# Patient Record
Sex: MALE | Race: WHITE | NOT HISPANIC OR LATINO | Employment: OTHER | URBAN - NONMETROPOLITAN AREA
[De-identification: names, ages, dates, MRNs, and addresses within clinical notes are randomized per-mention and may not be internally consistent; named-entity substitution may affect disease eponyms.]

---

## 2017-03-23 ENCOUNTER — CLINICAL SUPPORT (OUTPATIENT)
Dept: CARDIOLOGY | Facility: CLINIC | Age: 72
End: 2017-03-23

## 2017-03-23 ENCOUNTER — OFFICE VISIT (OUTPATIENT)
Dept: CARDIOLOGY | Facility: CLINIC | Age: 72
End: 2017-03-23

## 2017-03-23 VITALS
BODY MASS INDEX: 27.86 KG/M2 | HEIGHT: 68 IN | WEIGHT: 183.8 LBS | DIASTOLIC BLOOD PRESSURE: 68 MMHG | SYSTOLIC BLOOD PRESSURE: 130 MMHG | HEART RATE: 70 BPM

## 2017-03-23 DIAGNOSIS — Z95.810 AICD (AUTOMATIC CARDIOVERTER/DEFIBRILLATOR) PRESENT: ICD-10-CM

## 2017-03-23 DIAGNOSIS — I42.8 NON-ISCHEMIC CARDIOMYOPATHY (HCC): ICD-10-CM

## 2017-03-23 DIAGNOSIS — E78.2 MIXED HYPERLIPIDEMIA: ICD-10-CM

## 2017-03-23 DIAGNOSIS — I42.8 NON-ISCHEMIC CARDIOMYOPATHY (HCC): Primary | ICD-10-CM

## 2017-03-23 DIAGNOSIS — Z95.810 AICD (AUTOMATIC CARDIOVERTER/DEFIBRILLATOR) PRESENT: Primary | ICD-10-CM

## 2017-03-23 PROCEDURE — 93000 ELECTROCARDIOGRAM COMPLETE: CPT | Performed by: NURSE PRACTITIONER

## 2017-03-23 PROCEDURE — 93284 PRGRMG EVAL IMPLANTABLE DFB: CPT | Performed by: INTERNAL MEDICINE

## 2017-03-23 PROCEDURE — 99213 OFFICE O/P EST LOW 20 MIN: CPT | Performed by: NURSE PRACTITIONER

## 2017-03-23 NOTE — PROGRESS NOTES
Subjective:     Encounter Date:03/23/2017      Patient ID: Wilmar Reyes Jr. is a 71 y.o. male.    Chief Complaint:  History of Present Illness   Patient is here for follow up on echo, which has an improved EF. Patient has nonischemic cardiomyopathy. Patient also has a history of chemo. Patient is stable.No weight gain, no leg swelling, no shortness of breath, no chest pain. Overall patient is stable. Due for upcoming surgery for biopsy of lymph node.     The following portions of the patient's history were reviewed and updated as appropriate: allergies, current medications, past family history, past medical history, past social history, past surgical history and problem list.   Past Medical History:   Diagnosis Date   • AICD (automatic cardioverter/defibrillator) present    • CAD (coronary artery disease)    • Cancer    • COPD (chronic obstructive pulmonary disease)    • HLD (hyperlipidemia)    • Hypertension    • Immune deficiency disorder    • Myocardial infarction      Past Surgical History:   Procedure Laterality Date   • A-V CARDIAC PACEMAKER INSERTION     • CARDIAC DEFIBRILLATOR PLACEMENT     • CORONARY ANGIOPLASTY     • LYMPH NODE BIOPSY      LEG   • SKIN CANCER EXCISION       Social History     Social History   • Marital status:      Spouse name: N/A   • Number of children: N/A   • Years of education: N/A     Occupational History   • Not on file.     Social History Main Topics   • Smoking status: Former Smoker   • Smokeless tobacco: Never Used   • Alcohol use No   • Drug use: No   • Sexual activity: No     Other Topics Concern   • Not on file     Social History Narrative     Current Outpatient Prescriptions on File Prior to Visit   Medication Sig Dispense Refill   • budesonide-formoterol (SYMBICORT) 160-4.5 MCG/ACT inhaler Inhale 2 puffs 2 (Two) Times a Day.     • carvedilol (COREG) 25 MG tablet Take 25 mg by mouth 2 (Two) Times a Day With Meals.     • fenofibrate (TRICOR) 145 MG tablet Take 145  mg by mouth Daily.     • furosemide (LASIX) 20 MG tablet Take 20 mg by mouth 2 (Two) Times a Day.     • HYDROcodone-acetaminophen (NORCO) 7.5-325 MG per tablet Take 1 tablet by mouth Every 6 (Six) Hours As Needed for moderate pain (4-6).     • ipratropium-albuterol (COMBIVENT RESPIMAT)  MCG/ACT inhaler Inhale 1 puff 4 (Four) Times a Day As Needed for wheezing.     • levoFLOXacin (LEVAQUIN) 500 MG tablet Take 1 tablet by mouth Daily. 6 tablet 0   • lisinopril (PRINIVIL,ZESTRIL) 40 MG tablet Take 40 mg by mouth Daily.     • nitroglycerin (NITROSTAT) 0.4 MG SL tablet Place 0.4 mg under the tongue Every 5 (Five) Minutes As Needed for chest pain. Take no more than 3 doses in 15 minutes.     • POTASSIUM CHLORIDE ER PO Take 10 mEq by mouth.     • rosuvastatin (CRESTOR) 40 MG tablet Take 40 mg by mouth Daily.       No current facility-administered medications on file prior to visit.      No Known Allergies      Review of Systems   Constitution: Negative for chills, decreased appetite, fever, malaise/fatigue, weight gain and weight loss.   HENT: Negative for headaches and nosebleeds.    Eyes: Negative for visual disturbance.   Cardiovascular: Positive for dyspnea on exertion. Negative for chest pain, leg swelling, near-syncope, orthopnea, palpitations, paroxysmal nocturnal dyspnea and syncope.   Respiratory: Positive for shortness of breath. Negative for cough, hemoptysis and snoring.    Endocrine: Negative for cold intolerance and heat intolerance.   Hematologic/Lymphatic: Negative for bleeding problem. Does not bruise/bleed easily.   Skin: Negative for rash.   Musculoskeletal: Negative for back pain and falls.   Gastrointestinal: Negative for abdominal pain, constipation, diarrhea, heartburn, melena, nausea and vomiting.   Genitourinary: Negative for hematuria.   Neurological: Negative for dizziness and light-headedness.   Psychiatric/Behavioral: Negative for altered mental status.   Allergic/Immunologic: Negative  "for persistent infections.         ECG 12 Lead  Date/Time: 3/23/2017 11:21 AM  Performed by: CANDIDA ARNOLD  Authorized by: CANDIDA ARNOLD   Comparison: compared with previous ECG from 12/22/2016  Similar to previous ECG  Comparison to previous ECG: AV Paced  Rhythm: paced               Objective:     Physical Exam   Constitutional: He is oriented to person, place, and time. He appears well-developed and well-nourished.   HENT:   Head: Normocephalic and atraumatic.   Eyes: Pupils are equal, round, and reactive to light.   Neck: Normal range of motion. Neck supple. No JVD present. Carotid bruit is not present.   Cardiovascular: Normal rate, regular rhythm, normal heart sounds and intact distal pulses.    Pulmonary/Chest: Effort normal and breath sounds normal.   Coarse breath sounds   Abdominal: Soft. Bowel sounds are normal.   Musculoskeletal: Normal range of motion.   Neurological: He is alert and oriented to person, place, and time. He has normal reflexes.   Skin: Skin is warm and dry.   Psychiatric: He has a normal mood and affect. His behavior is normal. Judgment and thought content normal.     Blood pressure 130/68, pulse 70, height 68\" (172.7 cm), weight 183 lb 12.8 oz (83.4 kg).    Lab Review:       Assessment:          Diagnosis Plan   1. Non-ischemic cardiomyopathy     2. Mixed hyperlipidemia     3. AICD (automatic cardioverter/defibrillator) present            Plan:       1. Echo was checked to reevaluate EF- 60%. Compensated heart failure.  2. Lipids followed by PCP  3. Will check AICD today, no check listed since 2015  4. Low salt and daily weights discussed       "

## 2017-03-24 NOTE — PROGRESS NOTES
Bi-V AICD Evaluation Report  In office    2017    Primary Cardiologist: Lexi  : Guidant Model: Energen  Implant date: 2013    Reason for evaluation:routine  Indication for AICD: nonischemic cardiomyopathy    Measurements  Atrial sensing:  P wave: 1.4 mV  Atrial threshold: 0.8 V @ 0.4 ms  Atrial lead impedance: 677 ohms  Ventricular sensing:  R wave: 22.36 mV  RV Threshold:  1.5V @ 0.4 ms  RV Impedance:  516 ohms  Shock impedance: 48 ohms\  LV sensin.8 mV  LV Threshold:  2.6V @ 2ms  LV Impedance:  883ohms      Diagnostic Data  Atrial paced: 75 %  Ventricular paced: 98 %  Other: 30 SVT episodes- longest 22 seconds, max v rate 195 bpm   6 a-fib episodes- longest 4 seconds, v-paced during episodes  Battery status: satisfactory   Est 3.5 years      Final Parameters  Mode: DDD     Lower rate: 70 bpm   Upper rate: 130 bpm  AV Delay: 200 ms paced    150 ms sensed   Atrial - Amplitude: 1.6 V   Pulse width: 0.4 ms   Sensitivity: 0.25 mV   Ventricular:  RV Amplitude: 3 V @ 0.4 ms   Sensitivity: 0.6 mV            LV Amplitude:  3.1V @ 2ms  Sensitivity: 1 mV  Changes made: changed atrial output to 1.6V, changed RV output to 3V, changed LV output to 3.1V  Conclusions: normal AICD function, no therapy delivered and stable pacing and sensing thresholds    Follow up: 3 months

## 2017-03-28 NOTE — PROGRESS NOTES
I have reviewed the notes, assessments, and/or procedures performed by Jo Zamora, I concur with her documentation of Wilmar Reyes Jr..

## 2017-06-05 ENCOUNTER — APPOINTMENT (OUTPATIENT)
Dept: GENERAL RADIOLOGY | Facility: HOSPITAL | Age: 72
End: 2017-06-05

## 2017-06-05 ENCOUNTER — HOSPITAL ENCOUNTER (EMERGENCY)
Facility: HOSPITAL | Age: 72
Discharge: HOME | End: 2017-06-05
Attending: FAMILY MEDICINE | Admitting: FAMILY MEDICINE

## 2017-06-05 VITALS
SYSTOLIC BLOOD PRESSURE: 113 MMHG | BODY MASS INDEX: 28.64 KG/M2 | RESPIRATION RATE: 18 BRPM | HEART RATE: 74 BPM | OXYGEN SATURATION: 96 % | TEMPERATURE: 99.1 F | WEIGHT: 189 LBS | DIASTOLIC BLOOD PRESSURE: 57 MMHG | HEIGHT: 68 IN

## 2017-06-05 DIAGNOSIS — E87.6 HYPOKALEMIA: ICD-10-CM

## 2017-06-05 DIAGNOSIS — R11.2 NON-INTRACTABLE VOMITING WITH NAUSEA, UNSPECIFIED VOMITING TYPE: ICD-10-CM

## 2017-06-05 DIAGNOSIS — A08.4 VIRAL GASTROENTERITIS: Primary | ICD-10-CM

## 2017-06-05 LAB
ALBUMIN SERPL-MCNC: 3 G/DL (ref 3.5–5)
ALBUMIN/GLOB SERPL: 1.1 G/DL (ref 1.1–2.5)
ALP SERPL-CCNC: 59 U/L (ref 24–120)
ALT SERPL W P-5'-P-CCNC: 25 U/L (ref 0–54)
AMYLASE SERPL-CCNC: 98 U/L (ref 30–110)
ANION GAP SERPL CALCULATED.3IONS-SCNC: 13 MMOL/L (ref 4–13)
AST SERPL-CCNC: 33 U/L (ref 7–45)
BACTERIA UR QL AUTO: ABNORMAL /HPF
BILIRUB SERPL-MCNC: 0.9 MG/DL (ref 0.1–1)
BILIRUB UR QL STRIP: NEGATIVE
BUN BLD-MCNC: 17 MG/DL (ref 5–21)
BUN/CREAT SERPL: 15.3 (ref 7–25)
CALCIUM SPEC-SCNC: 8.4 MG/DL (ref 8.4–10.4)
CHLORIDE SERPL-SCNC: 108 MMOL/L (ref 98–110)
CK MB SERPL-CCNC: 0.22 NG/ML (ref 0–5)
CLARITY UR: ABNORMAL
CO2 SERPL-SCNC: 19 MMOL/L (ref 24–31)
COLOR UR: ABNORMAL
CREAT BLD-MCNC: 1.11 MG/DL (ref 0.5–1.4)
D-LACTATE SERPL-SCNC: 0.9 MMOL/L (ref 0.5–2)
DEPRECATED RDW RBC AUTO: 41.8 FL (ref 40–54)
ERYTHROCYTE [DISTWIDTH] IN BLOOD BY AUTOMATED COUNT: 13 % (ref 12–15)
ERYTHROCYTE [SEDIMENTATION RATE] IN BLOOD: 3 MM/HR (ref 0–15)
GFR SERPL CREATININE-BSD FRML MDRD: 65 ML/MIN/1.73
GLOBULIN UR ELPH-MCNC: 2.7 GM/DL
GLUCOSE BLD-MCNC: 111 MG/DL (ref 70–100)
GLUCOSE UR STRIP-MCNC: NEGATIVE MG/DL
HCT VFR BLD AUTO: 33.9 % (ref 40–52)
HGB BLD-MCNC: 11.6 G/DL (ref 14–18)
HGB UR QL STRIP.AUTO: NEGATIVE
HOLD SPECIMEN: NORMAL
HOLD SPECIMEN: NORMAL
HYALINE CASTS UR QL AUTO: ABNORMAL /LPF
KETONES UR QL STRIP: NEGATIVE
LEUKOCYTE ESTERASE UR QL STRIP.AUTO: ABNORMAL
LIPASE SERPL-CCNC: 72 U/L (ref 23–203)
LYMPHOCYTES # BLD MANUAL: 0.1 10*3/MM3 (ref 0.72–4.86)
LYMPHOCYTES NFR BLD MANUAL: 2 % (ref 15–45)
MCH RBC QN AUTO: 30.1 PG (ref 28–32)
MCHC RBC AUTO-ENTMCNC: 34.2 G/DL (ref 33–36)
MCV RBC AUTO: 87.8 FL (ref 82–95)
MYOGLOBIN SERPL-MCNC: 49.4 NG/ML (ref 0–110)
NEUTROPHILS # BLD AUTO: 4.66 10*3/MM3 (ref 1.87–8.4)
NEUTROPHILS NFR BLD MANUAL: 66 % (ref 39–78)
NEUTS BAND NFR BLD MANUAL: 32 % (ref 0–10)
NITRITE UR QL STRIP: POSITIVE
PH UR STRIP.AUTO: 5.5 [PH] (ref 5–8)
PLATELET # BLD AUTO: 100 10*3/MM3 (ref 130–400)
PMV BLD AUTO: 10.8 FL (ref 6–12)
POIKILOCYTOSIS BLD QL SMEAR: ABNORMAL
POTASSIUM BLD-SCNC: 3.2 MMOL/L (ref 3.5–5.3)
PROT SERPL-MCNC: 5.7 G/DL (ref 6.3–8.7)
PROT UR QL STRIP: ABNORMAL
RBC # BLD AUTO: 3.86 10*6/MM3 (ref 4.8–5.9)
RBC # UR: ABNORMAL /HPF
REF LAB TEST METHOD: ABNORMAL
SCAN SLIDE: NORMAL
SMALL PLATELETS BLD QL SMEAR: ABNORMAL
SODIUM BLD-SCNC: 140 MMOL/L (ref 135–145)
SP GR UR STRIP: 1.02 (ref 1–1.03)
SQUAMOUS #/AREA URNS HPF: ABNORMAL /HPF
UROBILINOGEN UR QL STRIP: ABNORMAL
WBC MORPH BLD: NORMAL
WBC NRBC COR # BLD: 4.76 10*3/MM3 (ref 4.8–10.8)
WBC UR QL AUTO: ABNORMAL /HPF
WHOLE BLOOD HOLD SPECIMEN: NORMAL
WHOLE BLOOD HOLD SPECIMEN: NORMAL

## 2017-06-05 PROCEDURE — 96374 THER/PROPH/DIAG INJ IV PUSH: CPT

## 2017-06-05 PROCEDURE — 85007 BL SMEAR W/DIFF WBC COUNT: CPT | Performed by: FAMILY MEDICINE

## 2017-06-05 PROCEDURE — 80053 COMPREHEN METABOLIC PANEL: CPT | Performed by: FAMILY MEDICINE

## 2017-06-05 PROCEDURE — 82150 ASSAY OF AMYLASE: CPT | Performed by: FAMILY MEDICINE

## 2017-06-05 PROCEDURE — 93010 ELECTROCARDIOGRAM REPORT: CPT | Performed by: INTERNAL MEDICINE

## 2017-06-05 PROCEDURE — 82553 CREATINE MB FRACTION: CPT | Performed by: FAMILY MEDICINE

## 2017-06-05 PROCEDURE — 83690 ASSAY OF LIPASE: CPT | Performed by: FAMILY MEDICINE

## 2017-06-05 PROCEDURE — 87086 URINE CULTURE/COLONY COUNT: CPT | Performed by: FAMILY MEDICINE

## 2017-06-05 PROCEDURE — 99284 EMERGENCY DEPT VISIT MOD MDM: CPT

## 2017-06-05 PROCEDURE — 85651 RBC SED RATE NONAUTOMATED: CPT | Performed by: FAMILY MEDICINE

## 2017-06-05 PROCEDURE — 74020 HC XR ABDOMEN FLAT & UPRIGHT: CPT

## 2017-06-05 PROCEDURE — 93005 ELECTROCARDIOGRAM TRACING: CPT | Performed by: FAMILY MEDICINE

## 2017-06-05 PROCEDURE — 87186 SC STD MICRODIL/AGAR DIL: CPT | Performed by: FAMILY MEDICINE

## 2017-06-05 PROCEDURE — 83874 ASSAY OF MYOGLOBIN: CPT | Performed by: FAMILY MEDICINE

## 2017-06-05 PROCEDURE — 96361 HYDRATE IV INFUSION ADD-ON: CPT

## 2017-06-05 PROCEDURE — 87077 CULTURE AEROBIC IDENTIFY: CPT | Performed by: FAMILY MEDICINE

## 2017-06-05 PROCEDURE — 85025 COMPLETE CBC W/AUTO DIFF WBC: CPT | Performed by: FAMILY MEDICINE

## 2017-06-05 PROCEDURE — 71010 HC CHEST PA OR AP: CPT

## 2017-06-05 PROCEDURE — 25010000002 ONDANSETRON PER 1 MG: Performed by: FAMILY MEDICINE

## 2017-06-05 PROCEDURE — 81001 URINALYSIS AUTO W/SCOPE: CPT | Performed by: FAMILY MEDICINE

## 2017-06-05 PROCEDURE — 83605 ASSAY OF LACTIC ACID: CPT | Performed by: FAMILY MEDICINE

## 2017-06-05 RX ORDER — SODIUM CHLORIDE 9 MG/ML
125 INJECTION, SOLUTION INTRAVENOUS CONTINUOUS
Status: DISCONTINUED | OUTPATIENT
Start: 2017-06-05 | End: 2017-06-05 | Stop reason: HOSPADM

## 2017-06-05 RX ORDER — ONDANSETRON 4 MG/1
4 TABLET, ORALLY DISINTEGRATING ORAL 4 TIMES DAILY PRN
Qty: 20 TABLET | Refills: 0 | Status: ON HOLD | OUTPATIENT
Start: 2017-06-05 | End: 2020-06-20

## 2017-06-05 RX ORDER — ONDANSETRON 2 MG/ML
4 INJECTION INTRAMUSCULAR; INTRAVENOUS ONCE
Status: COMPLETED | OUTPATIENT
Start: 2017-06-05 | End: 2017-06-05

## 2017-06-05 RX ORDER — POTASSIUM CHLORIDE 20 MEQ/1
20 TABLET, EXTENDED RELEASE ORAL ONCE
Status: DISCONTINUED | OUTPATIENT
Start: 2017-06-05 | End: 2017-06-05 | Stop reason: CLARIF

## 2017-06-05 RX ORDER — POTASSIUM CHLORIDE 750 MG/1
20 CAPSULE, EXTENDED RELEASE ORAL ONCE
Status: COMPLETED | OUTPATIENT
Start: 2017-06-05 | End: 2017-06-05

## 2017-06-05 RX ADMIN — POTASSIUM CHLORIDE 20 MEQ: 750 CAPSULE, EXTENDED RELEASE ORAL at 19:56

## 2017-06-05 RX ADMIN — SODIUM CHLORIDE 1000 ML: 9 INJECTION, SOLUTION INTRAVENOUS at 19:48

## 2017-06-05 RX ADMIN — SODIUM CHLORIDE 125 ML/HR: 9 INJECTION, SOLUTION INTRAVENOUS at 20:47

## 2017-06-05 RX ADMIN — ONDANSETRON 4 MG: 2 INJECTION INTRAMUSCULAR; INTRAVENOUS at 19:46

## 2017-06-05 NOTE — ED NOTES
PT STATES THAT HE HAD A FEVER BEFORE HE WENT FOR CHEMO THIS MORNING, PT STATED THEY STILL GAVE HIM IS CHEMO SHOT THIS MORNING.     Nena García RN  06/05/17 1661

## 2017-06-05 NOTE — ED PROVIDER NOTES
Subjective   Patient is a 71 y.o. male presenting with nausea.   Nausea   The primary symptoms include fever, fatigue, abdominal pain, nausea and vomiting. Primary symptoms do not include diarrhea, dysuria, arthralgias or rash. The illness began 3 to 5 days ago. The onset was gradual. The problem has not changed since onset.  The illness is also significant for chills and anorexia. The illness does not include back pain. Associated medical issues comments: currently being treated for melanoma. Risk factors: chemo.       Review of Systems   Constitutional: Positive for chills, fatigue and fever. Negative for activity change, appetite change and diaphoresis.   HENT: Negative for congestion, ear pain, nosebleeds, postnasal drip and sinus pressure.    Eyes: Negative for photophobia and pain.   Respiratory: Negative for cough, chest tightness, shortness of breath and wheezing.    Cardiovascular: Negative for chest pain.   Gastrointestinal: Positive for abdominal pain, anorexia, nausea and vomiting. Negative for abdominal distention, blood in stool and diarrhea.   Endocrine: Negative for cold intolerance and heat intolerance.   Genitourinary: Negative for difficulty urinating, dysuria and flank pain.   Musculoskeletal: Negative for arthralgias, back pain, neck pain and neck stiffness.   Skin: Positive for pallor. Negative for rash.   Neurological: Negative for dizziness, weakness and headaches.   Hematological: Negative for adenopathy. Does not bruise/bleed easily.   Psychiatric/Behavioral: Negative for confusion and sleep disturbance. The patient is not nervous/anxious.        Past Medical History:   Diagnosis Date   • AICD (automatic cardioverter/defibrillator) present    • CAD (coronary artery disease)    • CAD in native artery     7/2015- ECHO: EF 50-55% (increased from 2/2013- 25%), abnormal LV diastolic function. 8/2015- LexiScan: negative for ischemia/scar 2/2013- Cath: Mild CAD, non-ischemic cardiomyopathy.   •  Cancer    • Chest pain, unspecified    • Chronic combined systolic and diastolic congestive heart failure      7/2015- ECHO: EF 50-55% (increased from 2/2013- 25%), abnormal LV diastolic function.as    • COPD (chronic obstructive pulmonary disease)    • HLD (hyperlipidemia)    • Hyperlipidemia, mixed    • Hypertension    • Hypertension, benign    • Immune deficiency disorder    • Left bundle branch block    • Myocardial infarction    • Non-ischemic cardiomyopathy     LifeVest   • Status post implantation of automatic cardioverter/defibrillator (AICD)      Placed 4/2013. Interrogated by [facility].       No Known Allergies    Past Surgical History:   Procedure Laterality Date   • A-V CARDIAC PACEMAKER INSERTION     • CARDIAC DEFIBRILLATOR PLACEMENT Left 02/05/2013    Sev dil nonischemic cardiomyopathy. Mild CAD.   • CORONARY ANGIOPLASTY     • LYMPH NODE BIOPSY      left leg lymp node bx 2/2016   • OTHER SURGICAL HISTORY  04/29/2013    BS BiV ICD Implantation   • SKIN CANCER EXCISION         Family History   Problem Relation Age of Onset   • Heart disease Mother    • Heart disease Father    • Heart disease Sister    • Heart disease Brother    • Coronary artery disease Other      premature       Social History     Social History   • Marital status:      Spouse name: N/A   • Number of children: N/A   • Years of education: N/A     Social History Main Topics   • Smoking status: Former Smoker   • Smokeless tobacco: Never Used   • Alcohol use No   • Drug use: No   • Sexual activity: No     Other Topics Concern   • None     Social History Narrative           Objective   Physical Exam   Constitutional: He is oriented to person, place, and time. He appears well-developed and well-nourished. No distress.   HENT:   Head: Atraumatic.   Nose: Nose normal.   Mouth/Throat: Oropharynx is clear and moist. Mucous membranes are dry.   Eyes: Conjunctivae are normal. Pupils are equal, round, and reactive to light. No scleral  icterus.   Neck: Normal range of motion. Neck supple. No JVD present. No thyromegaly present.   Cardiovascular: Normal rate, regular rhythm, normal heart sounds and intact distal pulses.    No murmur heard.  Pulmonary/Chest: Effort normal and breath sounds normal. No respiratory distress. He has no wheezes. He has no rales. He exhibits no tenderness.   Abdominal: Soft. Bowel sounds are normal. He exhibits no distension and no mass. There is no tenderness. There is no rebound and no guarding.   Musculoskeletal: Normal range of motion. He exhibits no edema.   Lymphadenopathy:     He has no cervical adenopathy.   Neurological: He is alert and oriented to person, place, and time. He has normal reflexes. No cranial nerve deficit. Coordination normal.   Skin: Skin is warm and dry. No rash noted. He is not diaphoretic. No erythema. There is pallor.   Psychiatric: He has a normal mood and affect. His behavior is normal. Judgment and thought content normal.   Nursing note and vitals reviewed.      Procedures         ED Course  ED Course   Comment By Time   Symptoms have resolved, no nausea or vomiting.  [facility] and to arrange outpatient follow up, she does not take call.  Patient is confident he can talk with her in the am.  He wishes to go home now. Cullen Sauceda MD 06/05 2122                  [facility]  Number of Diagnoses or Management Options  Hypokalemia: new and requires workup  Non-intractable vomiting with nausea, unspecified vomiting type: new and requires workup  Viral gastroenteritis: new and requires workup     Amount and/or Complexity of Data Reviewed  Clinical lab tests: ordered and reviewed  Tests in the radiology section of CPT®: ordered and reviewed  Decide to obtain previous medical records or to obtain history from someone other than the patient: yes  Review and summarize past medical records: yes  Independent visualization of images, tracings,  or specimens: yes    Risk of Complications, Morbidity, and/or Mortality  Presenting problems: moderate  Diagnostic procedures: moderate  Management options: moderate    Patient Progress  Patient progress: resolved      Final diagnoses:   Viral gastroenteritis   Non-intractable vomiting with nausea, unspecified vomiting type   Hypokalemia            Cullen Sauceda MD  06/05/17 0348

## 2017-06-06 ENCOUNTER — HOSPITAL ENCOUNTER (EMERGENCY)
Facility: HOSPITAL | Age: 72
Discharge: HOME | End: 2017-06-06
Attending: EMERGENCY MEDICINE | Admitting: EMERGENCY MEDICINE

## 2017-06-06 ENCOUNTER — APPOINTMENT (OUTPATIENT)
Dept: GENERAL RADIOLOGY | Facility: HOSPITAL | Age: 72
End: 2017-06-06

## 2017-06-06 ENCOUNTER — APPOINTMENT (OUTPATIENT)
Dept: CT IMAGING | Facility: HOSPITAL | Age: 72
End: 2017-06-06

## 2017-06-06 VITALS
RESPIRATION RATE: 15 BRPM | OXYGEN SATURATION: 96 % | HEART RATE: 74 BPM | BODY MASS INDEX: 28.64 KG/M2 | WEIGHT: 189 LBS | TEMPERATURE: 97.9 F | SYSTOLIC BLOOD PRESSURE: 119 MMHG | HEIGHT: 68 IN | DIASTOLIC BLOOD PRESSURE: 66 MMHG

## 2017-06-06 DIAGNOSIS — N39.0 ACUTE UTI: Primary | ICD-10-CM

## 2017-06-06 LAB
ALBUMIN SERPL-MCNC: 2.7 G/DL (ref 3.5–5)
ALBUMIN/GLOB SERPL: 1.1 G/DL (ref 1.1–2.5)
ALP SERPL-CCNC: 63 U/L (ref 24–120)
ALT SERPL W P-5'-P-CCNC: 29 U/L (ref 0–54)
ANION GAP SERPL CALCULATED.3IONS-SCNC: 10 MMOL/L (ref 4–13)
APTT PPP: 21 SECONDS (ref 24.1–34.8)
AST SERPL-CCNC: 30 U/L (ref 7–45)
BACTERIA UR QL AUTO: ABNORMAL /HPF
BASOPHILS # BLD AUTO: 0 10*3/MM3 (ref 0–0.2)
BASOPHILS NFR BLD AUTO: 0 % (ref 0–2)
BILIRUB SERPL-MCNC: 0.9 MG/DL (ref 0.1–1)
BILIRUB UR QL STRIP: NEGATIVE
BUN BLD-MCNC: 12 MG/DL (ref 5–21)
BUN/CREAT SERPL: 10.3 (ref 7–25)
CALCIUM SPEC-SCNC: 7.8 MG/DL (ref 8.4–10.4)
CHLORIDE SERPL-SCNC: 110 MMOL/L (ref 98–110)
CLARITY UR: CLEAR
CO2 SERPL-SCNC: 18 MMOL/L (ref 24–31)
COLOR UR: YELLOW
CREAT BLD-MCNC: 1.17 MG/DL (ref 0.5–1.4)
DEPRECATED RDW RBC AUTO: 42.8 FL (ref 40–54)
EOSINOPHIL # BLD AUTO: 0 10*3/MM3 (ref 0–0.7)
EOSINOPHIL NFR BLD AUTO: 0 % (ref 0–4)
ERYTHROCYTE [DISTWIDTH] IN BLOOD BY AUTOMATED COUNT: 13.1 % (ref 12–15)
GFR SERPL CREATININE-BSD FRML MDRD: 61 ML/MIN/1.73
GLOBULIN UR ELPH-MCNC: 2.4 GM/DL
GLUCOSE BLD-MCNC: 97 MG/DL (ref 70–100)
GLUCOSE UR STRIP-MCNC: NEGATIVE MG/DL
HCT VFR BLD AUTO: 31.6 % (ref 40–52)
HGB BLD-MCNC: 10.7 G/DL (ref 14–18)
HGB UR QL STRIP.AUTO: NEGATIVE
HYALINE CASTS UR QL AUTO: ABNORMAL /LPF
IMM GRANULOCYTES # BLD: 0.01 10*3/MM3 (ref 0–0.03)
IMM GRANULOCYTES NFR BLD: 0.3 % (ref 0–5)
INR PPP: 1.1 (ref 0.91–1.09)
KETONES UR QL STRIP: NEGATIVE
LEUKOCYTE ESTERASE UR QL STRIP.AUTO: ABNORMAL
LYMPHOCYTES # BLD AUTO: 0.24 10*3/MM3 (ref 0.72–4.86)
LYMPHOCYTES NFR BLD AUTO: 7.5 % (ref 15–45)
MCH RBC QN AUTO: 30 PG (ref 28–32)
MCHC RBC AUTO-ENTMCNC: 33.9 G/DL (ref 33–36)
MCV RBC AUTO: 88.5 FL (ref 82–95)
MONOCYTES # BLD AUTO: 0.11 10*3/MM3 (ref 0.19–1.3)
MONOCYTES NFR BLD AUTO: 3.4 % (ref 4–12)
NEUTROPHILS # BLD AUTO: 2.84 10*3/MM3 (ref 1.87–8.4)
NEUTROPHILS NFR BLD AUTO: 88.8 % (ref 39–78)
NITRITE UR QL STRIP: POSITIVE
NT-PROBNP SERPL-MCNC: 3060 PG/ML (ref 0–900)
PH UR STRIP.AUTO: <=5 [PH] (ref 5–8)
PLATELET # BLD AUTO: 71 10*3/MM3 (ref 130–400)
PMV BLD AUTO: 11 FL (ref 6–12)
POTASSIUM BLD-SCNC: 3.1 MMOL/L (ref 3.5–5.3)
PROT SERPL-MCNC: 5.1 G/DL (ref 6.3–8.7)
PROT UR QL STRIP: NEGATIVE
PROTHROMBIN TIME: 14.6 SECONDS (ref 11.9–14.6)
RBC # BLD AUTO: 3.57 10*6/MM3 (ref 4.8–5.9)
RBC # UR: ABNORMAL /HPF
REF LAB TEST METHOD: ABNORMAL
SODIUM BLD-SCNC: 138 MMOL/L (ref 135–145)
SP GR UR STRIP: 1.01 (ref 1–1.03)
SQUAMOUS #/AREA URNS HPF: ABNORMAL /HPF
TROPONIN I SERPL-MCNC: 0.04 NG/ML (ref 0–0.07)
UROBILINOGEN UR QL STRIP: ABNORMAL
WBC NRBC COR # BLD: 3.2 10*3/MM3 (ref 4.8–10.8)
WBC UR QL AUTO: ABNORMAL /HPF

## 2017-06-06 PROCEDURE — 93010 ELECTROCARDIOGRAM REPORT: CPT | Performed by: INTERNAL MEDICINE

## 2017-06-06 PROCEDURE — 87077 CULTURE AEROBIC IDENTIFY: CPT | Performed by: EMERGENCY MEDICINE

## 2017-06-06 PROCEDURE — 85025 COMPLETE CBC W/AUTO DIFF WBC: CPT | Performed by: EMERGENCY MEDICINE

## 2017-06-06 PROCEDURE — 87040 BLOOD CULTURE FOR BACTERIA: CPT | Performed by: EMERGENCY MEDICINE

## 2017-06-06 PROCEDURE — 99284 EMERGENCY DEPT VISIT MOD MDM: CPT

## 2017-06-06 PROCEDURE — 87086 URINE CULTURE/COLONY COUNT: CPT | Performed by: EMERGENCY MEDICINE

## 2017-06-06 PROCEDURE — 93005 ELECTROCARDIOGRAM TRACING: CPT | Performed by: EMERGENCY MEDICINE

## 2017-06-06 PROCEDURE — 85610 PROTHROMBIN TIME: CPT | Performed by: EMERGENCY MEDICINE

## 2017-06-06 PROCEDURE — 94640 AIRWAY INHALATION TREATMENT: CPT

## 2017-06-06 PROCEDURE — 94799 UNLISTED PULMONARY SVC/PX: CPT

## 2017-06-06 PROCEDURE — 83880 ASSAY OF NATRIURETIC PEPTIDE: CPT | Performed by: EMERGENCY MEDICINE

## 2017-06-06 PROCEDURE — 71010 HC CHEST PA OR AP: CPT

## 2017-06-06 PROCEDURE — 74176 CT ABD & PELVIS W/O CONTRAST: CPT

## 2017-06-06 PROCEDURE — 81001 URINALYSIS AUTO W/SCOPE: CPT | Performed by: EMERGENCY MEDICINE

## 2017-06-06 PROCEDURE — 85730 THROMBOPLASTIN TIME PARTIAL: CPT | Performed by: EMERGENCY MEDICINE

## 2017-06-06 PROCEDURE — 96365 THER/PROPH/DIAG IV INF INIT: CPT

## 2017-06-06 PROCEDURE — 87186 SC STD MICRODIL/AGAR DIL: CPT | Performed by: EMERGENCY MEDICINE

## 2017-06-06 PROCEDURE — 80053 COMPREHEN METABOLIC PANEL: CPT | Performed by: EMERGENCY MEDICINE

## 2017-06-06 PROCEDURE — 84484 ASSAY OF TROPONIN QUANT: CPT

## 2017-06-06 PROCEDURE — 25010000002 CEFTRIAXONE: Performed by: EMERGENCY MEDICINE

## 2017-06-06 RX ORDER — IPRATROPIUM BROMIDE AND ALBUTEROL SULFATE 2.5; .5 MG/3ML; MG/3ML
3 SOLUTION RESPIRATORY (INHALATION) ONCE
Status: COMPLETED | OUTPATIENT
Start: 2017-06-06 | End: 2017-06-06

## 2017-06-06 RX ORDER — POTASSIUM CHLORIDE 20 MEQ/1
40 TABLET, EXTENDED RELEASE ORAL ONCE
Status: DISCONTINUED | OUTPATIENT
Start: 2017-06-06 | End: 2017-06-06 | Stop reason: CLARIF

## 2017-06-06 RX ORDER — POTASSIUM CHLORIDE 750 MG/1
40 CAPSULE, EXTENDED RELEASE ORAL ONCE
Status: COMPLETED | OUTPATIENT
Start: 2017-06-06 | End: 2017-06-06

## 2017-06-06 RX ADMIN — CEFTRIAXONE 1 G: 1 INJECTION, POWDER, FOR SOLUTION INTRAMUSCULAR; INTRAVENOUS at 18:35

## 2017-06-06 RX ADMIN — IPRATROPIUM BROMIDE AND ALBUTEROL SULFATE 3 ML: .5; 3 SOLUTION RESPIRATORY (INHALATION) at 15:39

## 2017-06-06 RX ADMIN — POTASSIUM CHLORIDE 40 MEQ: 750 CAPSULE, EXTENDED RELEASE ORAL at 18:32

## 2017-06-06 NOTE — ED PROVIDER NOTES
Subjective fever and chills.   History of Present Illness  Her hasty is a 71-year-old white man who presents today with chief complaint of fever and chills.  He was apparently seen here last night and diagnosed with a urinary tract infection.  He was subsequently given antibiotics.  He however has only taken one dose and states that he experience fevers and chills shaking chills and subsequently decided he will return to the emergency room to be reevaluated.  Upon my arrival at the bedside he says he does not feel well.  Review of Systems   Constitutional: Positive for chills and fever.   HENT: Negative.    Eyes: Negative.    Respiratory: Negative.    Cardiovascular: Negative.    Gastrointestinal: Negative.    Endocrine: Negative.    Genitourinary: Negative.    Musculoskeletal: Negative.    Skin: Negative.    Allergic/Immunologic: Negative.    Neurological: Negative.    Hematological: Negative.    Psychiatric/Behavioral: Negative.    All other systems reviewed and are negative.      Past Medical History:   Diagnosis Date   • AICD (automatic cardioverter/defibrillator) present    • CAD (coronary artery disease)    • CAD in native artery     7/2015- ECHO: EF 50-55% (increased from 2/2013- 25%), abnormal LV diastolic function. 8/2015- LexiScan: negative for ischemia/scar 2/2013- Cath: Mild CAD, non-ischemic cardiomyopathy.   • Cancer    • Chest pain, unspecified    • Chronic combined systolic and diastolic congestive heart failure      7/2015- ECHO: EF 50-55% (increased from 2/2013- 25%), abnormal LV diastolic function.as    • COPD (chronic obstructive pulmonary disease)    • HLD (hyperlipidemia)    • Hyperlipidemia, mixed    • Hypertension    • Hypertension, benign    • Immune deficiency disorder    • Left bundle branch block    • Myocardial infarction    • Non-ischemic cardiomyopathy     LifeVest   • Status post implantation of automatic cardioverter/defibrillator (AICD)      Placed 4/2013. Interrogated by [facility].       No Known Allergies    Past Surgical History:   Procedure Laterality Date   • A-V CARDIAC PACEMAKER INSERTION     • CARDIAC DEFIBRILLATOR PLACEMENT Left 02/05/2013    Sev dil nonischemic cardiomyopathy. Mild CAD.   • CORONARY ANGIOPLASTY     • LYMPH NODE BIOPSY      left leg lymp node bx 2/2016   • OTHER SURGICAL HISTORY  04/29/2013    BS BiV ICD Implantation   • SKIN CANCER EXCISION         Family History   Problem Relation Age of Onset   • Heart disease Mother    • Heart disease Father    • Heart disease Sister    • Heart disease Brother    • Coronary artery disease Other      premature       Social History     Social History   • Marital status:      Spouse name: N/A   • Number of children: N/A   • Years of education: N/A     Social History Main Topics   • Smoking status: Former Smoker   • Smokeless tobacco: Never Used   • Alcohol use No   • Drug use: No   • Sexual activity: No     Other Topics Concern   • None     Social History Narrative           Objective   Physical Exam   Constitutional: He is oriented to person, place, and time. He appears well-developed and well-nourished.   HENT:   Head: Normocephalic and atraumatic.   Right Ear: External ear normal.   Left Ear: External ear normal.   Nose: Nose normal.   Mouth/Throat: Oropharynx is clear and moist.   Eyes: Conjunctivae and EOM are normal. Pupils are equal, round, and reactive to light. Right eye exhibits no discharge. Left eye exhibits no discharge.   Neck: Normal range of motion. Neck supple. No thyromegaly present.   Cardiovascular: Normal rate, regular rhythm, normal heart sounds and intact distal pulses.  Exam reveals no friction rub.    No murmur heard.  Pulmonary/Chest: Effort normal and breath sounds normal. No respiratory distress.   Abdominal: Soft. Bowel sounds are normal. He exhibits no distension. There is no tenderness.   Musculoskeletal: Normal range of motion. He exhibits no edema or deformity.   Neurological: He is  alert and oriented to person, place, and time. He has normal reflexes. No cranial nerve deficit.   Skin: Skin is warm and dry. No rash noted.   Psychiatric: Judgment normal.   Nursing note and vitals reviewed.      Procedures         ED Course  ED Course   Comment By Time   I attempted to relay the results of the tests to the patient.  He has only had one dose of antibiotics at this time.  I did note the results of the CT scan however when I went to check the left inguinal area there is a healing surgical scar.  I explained to the patient that this point he is only had 1 dose of antibiotics.  My plan is to recommend that he continue his antibiotics.  Discharged to home with instructions to follow-up with his own primary care physician.  I will give him a dose of Rocephin just prior to discharge. Mally Boss MD 06/06 8503                  MDM  Number of Diagnoses or Management Options  Acute UTI: established and worsening     Amount and/or Complexity of Data Reviewed  Clinical lab tests: ordered and reviewed  Tests in the radiology section of CPT®: ordered and reviewed    Risk of Complications, Morbidity, and/or Mortality  Presenting problems: moderate  Diagnostic procedures: moderate  Management options: moderate    Patient Progress  Patient progress: stable      Final diagnoses:   Acute UTI            Mally Boss MD  06/06/17 [id number]

## 2017-06-06 NOTE — DISCHARGE INSTRUCTIONS
Please complete a course of antibiotics that you were given prior to discharge last night.  I recommend encouragement of fluids.  I also recommend that she follow with your own primary care physician within the next 24 hours.  Of course if your symptoms worsen or you are unable to follow with your primary care physician please feel free to return to the emergency room at any time.  Few do develop a temperature I would recommend that you take some Tylenol for it.

## 2017-06-07 LAB — BACTERIA SPEC AEROBE CULT: ABNORMAL

## 2017-06-08 LAB — BACTERIA SPEC AEROBE CULT: ABNORMAL

## 2017-06-09 ENCOUNTER — TELEPHONE (OUTPATIENT)
Dept: EMERGENCY DEPT | Facility: HOSPITAL | Age: 72
End: 2017-06-09

## 2017-06-09 NOTE — TELEPHONE ENCOUNTER
----- Message from RACHEL Mcginnis sent at 6/9/2017  9:23 AM CDT -----  Stop levaquin; start omnicef 300mg bid x 10 days   ----- Message -----     From: Lab, Background User     Sent: 6/6/2017   7:37 AM       To: Bh Pad Asap In Basket Results Pool

## 2017-06-11 LAB
BACTERIA SPEC AEROBE CULT: NORMAL
BACTERIA SPEC AEROBE CULT: NORMAL

## 2017-07-13 ENCOUNTER — CLINICAL SUPPORT (OUTPATIENT)
Dept: CARDIOLOGY | Facility: CLINIC | Age: 72
End: 2017-07-13

## 2017-07-13 ENCOUNTER — OFFICE VISIT (OUTPATIENT)
Dept: CARDIOLOGY | Facility: CLINIC | Age: 72
End: 2017-07-13

## 2017-07-13 VITALS
WEIGHT: 180.6 LBS | OXYGEN SATURATION: 98 % | SYSTOLIC BLOOD PRESSURE: 138 MMHG | DIASTOLIC BLOOD PRESSURE: 72 MMHG | BODY MASS INDEX: 27.37 KG/M2 | HEART RATE: 71 BPM | HEIGHT: 68 IN

## 2017-07-13 DIAGNOSIS — Z95.810 AICD (AUTOMATIC CARDIOVERTER/DEFIBRILLATOR) PRESENT: ICD-10-CM

## 2017-07-13 DIAGNOSIS — I42.8 NON-ISCHEMIC CARDIOMYOPATHY (HCC): ICD-10-CM

## 2017-07-13 DIAGNOSIS — E78.2 MIXED HYPERLIPIDEMIA: Primary | ICD-10-CM

## 2017-07-13 DIAGNOSIS — E16.2 HYPOGLYCEMIA: ICD-10-CM

## 2017-07-13 DIAGNOSIS — C43.9 METASTATIC MELANOMA (HCC): ICD-10-CM

## 2017-07-13 DIAGNOSIS — Z95.810 AICD (AUTOMATIC CARDIOVERTER/DEFIBRILLATOR) PRESENT: Primary | ICD-10-CM

## 2017-07-13 PROBLEM — I48.3 TYPICAL ATRIAL FLUTTER (HCC): Status: ACTIVE | Noted: 2017-07-13

## 2017-07-13 PROCEDURE — 93284 PRGRMG EVAL IMPLANTABLE DFB: CPT | Performed by: INTERNAL MEDICINE

## 2017-07-13 PROCEDURE — 99214 OFFICE O/P EST MOD 30 MIN: CPT | Performed by: INTERNAL MEDICINE

## 2017-07-13 RX ORDER — ACETAMINOPHEN 325 MG/1
650 TABLET ORAL EVERY 6 HOURS PRN
COMMUNITY

## 2017-07-13 NOTE — PROGRESS NOTES
Bi-V AICD Evaluation Report  In office    July 13, 2017    Primary Cardiologist: Lexi  : Guidant Model: Energen  Implant date: April 29, 2013    Reason for evaluation: routine  Indication for AICD: nonischemic cardiomyopathy    Measurements  Atrial sensing:  P wave: 1 mV  Atrial threshold: 0.7 V @ 0.4 ms  Atrial lead impedance: 692 ohms  Ventricular sensing:  R wave: >25 mV  RV Threshold:  1.4V @ 0.4 ms  RV Impedance:  502 ohms  Shock impedance:  51 ohms  LV Threshold:  1.8V @ 2ms  LV Impedance:  686 ohms      Diagnostic Data  Atrial paced: 78 %  Ventricular paced: 98 %  Other: 90 episodes- per Dr. Elliott it is paroxysmal a-fib- longest 17 seconds, max v rate 183 bpm  Battery status: satisfactory   Est 4 years      Final Parameters  Mode: DDD     Lower rate: 70 bpm   Upper rate: 130 bpm  AV Delay: 200 ms paced    150 ms sensed   Atrial - Amplitude: 1.6 V   Pulse width: 0.4 ms   Sensitivity: 0.25 mV   Ventricular:  RV Amplitude: 3 V @ 0.4 ms   Sensitivity: 0.6 mV            LV Amplitude:  2.3V @ 2ms  Sensitivity:  1 mV  Changes made: changed LV amplitude to 2.3V  Conclusions: normal AICD function, no therapy delivered and stable pacing and sensing thresholds    Follow up: 3 months

## 2017-07-13 NOTE — PROGRESS NOTES
Wilmar Reyes Jr.  [id number]  1945  71 y.o.  male    Referring Provider: Alex Haynes MD    Reason for Follow-up Visit: congestive heart failure  Device check shows Paroxysmal atrial fibrillation   No pedal edema  Overall doing well  Denies any chest pain  No excessive shortness of breath  No palpitations  Compliant with medications        History of present illness:  Wilmar Reyes Jr. is a 71 y.o. yo male with history of congestive heart failure  who presents today for   Chief Complaint   Patient presents with   • Cardiomyopathy     3 month f/u    .    History  Past Medical History:   Diagnosis Date   • AICD (automatic cardioverter/defibrillator) present    • CAD (coronary artery disease)    • CAD in native artery     7/2015- ECHO: EF 50-55% (increased from 2/2013- 25%), abnormal LV diastolic function. 8/2015- LexiScan: negative for ischemia/scar 2/2013- Cath: Mild CAD, non-ischemic cardiomyopathy.   • Cancer    • Chest pain, unspecified    • Chronic combined systolic and diastolic congestive heart failure      7/2015- ECHO: EF 50-55% (increased from 2/2013- 25%), abnormal LV diastolic function.as    • COPD (chronic obstructive pulmonary disease)    • FH: chemotherapy    • HLD (hyperlipidemia)    • Hyperlipidemia, mixed    • Hypertension    • Hypertension, benign    • Immune deficiency disorder    • Left bundle branch block    • Myocardial infarction    • Non-ischemic cardiomyopathy     LifeVest   • Status post implantation of automatic cardioverter/defibrillator (AICD)      Placed 4/2013. Interrogated by [facility].   ,   Past Surgical History:   Procedure Laterality Date   • A-V CARDIAC PACEMAKER INSERTION     • CARDIAC DEFIBRILLATOR PLACEMENT Left 02/05/2013    Sev dil nonischemic cardiomyopathy. Mild CAD.   • CORONARY ANGIOPLASTY     • LYMPH NODE BIOPSY      left leg lymp node bx 2/2016   • OTHER SURGICAL HISTORY  04/29/2013    BS BiV ICD Implantation   • SKIN CANCER EXCISION     ,   Family History    Problem Relation Age of Onset   • Heart disease Mother    • Heart disease Father    • Heart disease Sister    • Heart disease Brother    • Coronary artery disease Other      premature   ,   Social History   Substance Use Topics   • Smoking status: Former Smoker   • Smokeless tobacco: Never Used   • Alcohol use No   ,     Medications  Current Outpatient Prescriptions   Medication Sig Dispense Refill   • acetaminophen (TYLENOL) 325 MG tablet Take 650 mg by mouth Every 6 (Six) Hours As Needed for Mild Pain (1-3).     • budesonide-formoterol (SYMBICORT) 160-4.5 MCG/ACT inhaler Inhale 2 puffs 2 (Two) Times a Day.     • carvedilol (COREG) 25 MG tablet Take 25 mg by mouth 2 (Two) Times a Day With Meals.     • fenofibrate (TRICOR) 145 MG tablet Take 145 mg by mouth Daily.     • furosemide (LASIX) 20 MG tablet Take 20 mg by mouth 2 (Two) Times a Day.     • HYDROcodone-acetaminophen (NORCO) 7.5-325 MG per tablet Take 1 tablet by mouth Every 6 (Six) Hours As Needed for moderate pain (4-6).     • ipratropium-albuterol (COMBIVENT RESPIMAT)  MCG/ACT inhaler Inhale 1 puff 4 (Four) Times a Day As Needed for wheezing.     • levoFLOXacin (LEVAQUIN) 500 MG tablet Take 1 tablet by mouth Daily. 6 tablet 0   • lisinopril (PRINIVIL,ZESTRIL) 40 MG tablet Take 40 mg by mouth Daily.     • nitroglycerin (NITROSTAT) 0.4 MG SL tablet Place 0.4 mg under the tongue Every 5 (Five) Minutes As Needed for chest pain. Take no more than 3 doses in 15 minutes.     • ondansetron ODT (ZOFRAN-ODT) 4 MG disintegrating tablet Take 1 tablet by mouth 4 (Four) Times a Day As Needed for Nausea or Vomiting. 20 tablet 0   • POTASSIUM CHLORIDE ER PO Take 10 mEq by mouth.     • rosuvastatin (CRESTOR) 40 MG tablet Take 40 mg by mouth Daily.     • apixaban (ELIQUIS) 5 MG tablet tablet Take 1 tablet by mouth 2 (Two) Times a Day. 60 tablet 11     No current facility-administered medications for this visit.        Allergies:  Review of patient's allergies  "indicates no known allergies.    Review of Systems  Review of Systems   Constitution: Negative for chills, decreased appetite, fever, malaise/fatigue, weight gain and weight loss.   HENT: Negative for headaches and nosebleeds.    Eyes: Negative for visual disturbance.   Cardiovascular: Positive for dyspnea on exertion. Negative for chest pain, leg swelling, near-syncope, orthopnea, palpitations, paroxysmal nocturnal dyspnea and syncope.   Respiratory: Positive for shortness of breath. Negative for cough, hemoptysis and snoring.    Endocrine: Negative for cold intolerance and heat intolerance.   Hematologic/Lymphatic: Negative for bleeding problem. Does not bruise/bleed easily.   Skin: Negative for rash.   Musculoskeletal: Negative for back pain and falls.   Gastrointestinal: Negative for abdominal pain, constipation, diarrhea, heartburn, melena, nausea and vomiting.   Genitourinary: Negative for hematuria.   Neurological: Negative for dizziness and light-headedness.   Psychiatric/Behavioral: Negative for altered mental status.   Allergic/Immunologic: Negative for persistent infections.       Objective     Physical Exam:  /72 (BP Location: Left arm, Patient Position: Sitting, Cuff Size: Adult)  Pulse 71  Ht 68\" (172.7 cm)  Wt 180 lb 9.6 oz (81.9 kg)  SpO2 98%  BMI 27.46 kg/m2  Physical Exam   Constitutional: He appears well-developed.   HENT:   Head: Normocephalic.   Neck: Normal carotid pulses and no JVD present. No tracheal tenderness present. Carotid bruit is not present. No tracheal deviation and no edema present.   Cardiovascular: Regular rhythm and normal pulses.    Murmur heard.   Systolic murmur is present with a grade of 2/6   Pulmonary/Chest: Effort normal. No stridor.   Abdominal: Soft.   Neurological: He is alert. He has normal strength. No cranial nerve deficit or sensory deficit.   Skin: Skin is warm.   Psychiatric: He has a normal mood and affect. His speech is normal and behavior is normal. "       Results Review:     Procedures    Assessment/Plan   Patient Active Problem List   Diagnosis   • Hypoglycemia   • Metastatic melanoma   • HLD (hyperlipidemia)   • Non-ischemic cardiomyopathy   • AICD (automatic cardioverter/defibrillator) present   • Typical atrial flutter       No palpitations. No significant pedal edema. Compliant with medications and diet. Latest labs and medications reviewed.    Plan:    Start Eliquis 5 mg BID  Monitor for any signs of bleeding including red or dark stools. Fall precautions.  Patient is asked to monitor BP at home or work, several times per month and return with written values at next office visit.   Monitor device function regularly per established schedule or PRN   Close follow up with you as scheduled.  Intensive factor modifications.  See order list.    Counseled regarding disease appropriate diet, fluid, caffeine, stimulants and sodium intake as well as importance of compliance to diet, exercise and regular follow up.  Avoid NSAIDS and COX2 inhibitors. Use Acetaminophen PRN.    Return in about 3 months (around 10/13/2017).

## 2017-07-26 NOTE — PROGRESS NOTES
I have reviewed the notes, assessments, and/or procedures performed by Jo Zamora RN , I concur with her  documentation of  Wilmar Reyes Jr..

## 2017-10-30 ENCOUNTER — CLINICAL SUPPORT (OUTPATIENT)
Dept: CARDIOLOGY | Facility: CLINIC | Age: 72
End: 2017-10-30

## 2017-10-30 ENCOUNTER — OFFICE VISIT (OUTPATIENT)
Dept: CARDIOLOGY | Facility: CLINIC | Age: 72
End: 2017-10-30

## 2017-10-30 VITALS
HEART RATE: 70 BPM | HEIGHT: 68 IN | SYSTOLIC BLOOD PRESSURE: 150 MMHG | BODY MASS INDEX: 27.89 KG/M2 | OXYGEN SATURATION: 98 % | DIASTOLIC BLOOD PRESSURE: 52 MMHG | WEIGHT: 184 LBS

## 2017-10-30 DIAGNOSIS — I42.8 NON-ISCHEMIC CARDIOMYOPATHY (HCC): ICD-10-CM

## 2017-10-30 DIAGNOSIS — Z95.810 AICD (AUTOMATIC CARDIOVERTER/DEFIBRILLATOR) PRESENT: ICD-10-CM

## 2017-10-30 DIAGNOSIS — E78.2 MIXED HYPERLIPIDEMIA: Primary | ICD-10-CM

## 2017-10-30 DIAGNOSIS — E16.2 HYPOGLYCEMIA: ICD-10-CM

## 2017-10-30 DIAGNOSIS — C43.9 METASTATIC MELANOMA (HCC): ICD-10-CM

## 2017-10-30 PROBLEM — I48.3 TYPICAL ATRIAL FLUTTER (HCC): Status: RESOLVED | Noted: 2017-07-13 | Resolved: 2017-10-30

## 2017-10-30 PROCEDURE — 99214 OFFICE O/P EST MOD 30 MIN: CPT | Performed by: INTERNAL MEDICINE

## 2017-10-30 PROCEDURE — 93288 INTERROG EVL PM/LDLS PM IP: CPT | Performed by: PHYSICIAN ASSISTANT

## 2017-10-30 PROCEDURE — 93000 ELECTROCARDIOGRAM COMPLETE: CPT | Performed by: INTERNAL MEDICINE

## 2017-10-30 NOTE — PROGRESS NOTES
Wilmar Reyes Jr.  [id number]  1945  72 y.o.  male    Referring Provider: Alex Haynes MD    Reason for Follow-up Visit:  Routine follow up.  Subjective   congestive heart failure  Device check shows Paroxysmal atrial fibrillation   No pedal edema  Overall doing well  Denies any chest pain  No excessive shortness of breath  No palpitations  Compliant with medications  Can a mile easily  12/22/16 echo  Left ventricular function is normal. Estimated EF = 60%.  Mild pulmonary hypertension      History of present illness:  Wilmar Reyes Jr. is a 72 y.o. yo male with history of congestive heart failure  who presents today for   Chief Complaint   Patient presents with   • Congestive Heart Failure     3 MON FU    • Leg Swelling   • Shortness of Breath     WITH EXERTION    .    History  Past Medical History:   Diagnosis Date   • AICD (automatic cardioverter/defibrillator) present    • CAD (coronary artery disease)    • CAD in native artery     7/2015- ECHO: EF 50-55% (increased from 2/2013- 25%), abnormal LV diastolic function. 8/2015- LexiScan: negative for ischemia/scar 2/2013- Cath: Mild CAD, non-ischemic cardiomyopathy.   • Cancer    • Chest pain, unspecified    • Chronic combined systolic and diastolic congestive heart failure      7/2015- ECHO: EF 50-55% (increased from 2/2013- 25%), abnormal LV diastolic function.as    • COPD (chronic obstructive pulmonary disease)    • FH: chemotherapy    • HLD (hyperlipidemia)    • Hyperlipidemia, mixed    • Hypertension    • Hypertension, benign    • Immune deficiency disorder    • Left bundle branch block    • Myocardial infarction    • Non-ischemic cardiomyopathy     LifeVest   • Status post implantation of automatic cardioverter/defibrillator (AICD)      Placed 4/2013. Interrogated by [facility].   ,   Past Surgical History:   Procedure Laterality Date   • A-V CARDIAC PACEMAKER INSERTION     • CARDIAC DEFIBRILLATOR PLACEMENT Left 02/05/2013    Sev dil nonischemic  cardiomyopathy. Mild CAD.   • CORONARY ANGIOPLASTY     • LYMPH NODE BIOPSY      left leg lymp node bx 2/2016   • OTHER SURGICAL HISTORY  04/29/2013    BS BiV ICD Implantation   • SKIN CANCER EXCISION     ,   Family History   Problem Relation Age of Onset   • Heart disease Mother    • Heart disease Father    • Heart disease Sister    • Heart disease Brother    • Coronary artery disease Other      premature   ,   Social History   Substance Use Topics   • Smoking status: Former Smoker     Years: 32.00     Types: Cigarettes   • Smokeless tobacco: Never Used   • Alcohol use No   ,     Medications  Current Outpatient Prescriptions   Medication Sig Dispense Refill   • acetaminophen (TYLENOL) 325 MG tablet Take 650 mg by mouth Every 6 (Six) Hours As Needed for Mild Pain (1-3).     • apixaban (ELIQUIS) 5 MG tablet tablet Take 1 tablet by mouth 2 (Two) Times a Day. 60 tablet 11   • budesonide-formoterol (SYMBICORT) 160-4.5 MCG/ACT inhaler Inhale 2 puffs 2 (Two) Times a Day.     • carvedilol (COREG) 25 MG tablet Take 25 mg by mouth 2 (Two) Times a Day With Meals.     • fenofibrate (TRICOR) 145 MG tablet Take 145 mg by mouth Daily.     • furosemide (LASIX) 20 MG tablet Take 20 mg by mouth 2 (Two) Times a Day.     • HYDROcodone-acetaminophen (NORCO) 7.5-325 MG per tablet Take 1 tablet by mouth Every 6 (Six) Hours As Needed for moderate pain (4-6).     • ipratropium-albuterol (COMBIVENT RESPIMAT)  MCG/ACT inhaler Inhale 1 puff 4 (Four) Times a Day As Needed for wheezing.     • lisinopril (PRINIVIL,ZESTRIL) 40 MG tablet Take 40 mg by mouth Daily.     • nitroglycerin (NITROSTAT) 0.4 MG SL tablet Place 0.4 mg under the tongue Every 5 (Five) Minutes As Needed for chest pain. Take no more than 3 doses in 15 minutes.     • ondansetron ODT (ZOFRAN-ODT) 4 MG disintegrating tablet Take 1 tablet by mouth 4 (Four) Times a Day As Needed for Nausea or Vomiting. 20 tablet 0   • POTASSIUM CHLORIDE ER PO Take 10 mEq by mouth.     •  "rosuvastatin (CRESTOR) 40 MG tablet Take 40 mg by mouth Daily.       No current facility-administered medications for this visit.        Allergies:  Review of patient's allergies indicates no known allergies.    Review of Systems  Review of Systems   Constitution: Negative for chills, decreased appetite, fever, malaise/fatigue, weight gain and weight loss.   HENT: Negative for nosebleeds.    Eyes: Negative for visual disturbance.   Cardiovascular: Positive for dyspnea on exertion. Negative for chest pain, leg swelling, near-syncope, orthopnea, palpitations, paroxysmal nocturnal dyspnea and syncope.   Respiratory: Positive for shortness of breath. Negative for cough, hemoptysis and snoring.    Endocrine: Negative for cold intolerance and heat intolerance.   Hematologic/Lymphatic: Negative for bleeding problem. Does not bruise/bleed easily.   Skin: Negative for rash.   Musculoskeletal: Negative for back pain and falls.   Gastrointestinal: Negative for abdominal pain, constipation, diarrhea, heartburn, melena, nausea and vomiting.   Genitourinary: Negative for hematuria.   Neurological: Negative for dizziness, headaches and light-headedness.   Psychiatric/Behavioral: Negative for altered mental status.   Allergic/Immunologic: Negative for persistent infections.       Objective     Physical Exam:  /52  Pulse 70  Ht 68\" (172.7 cm)  Wt 184 lb (83.5 kg)  SpO2 98%  BMI 27.98 kg/m2  Physical Exam   Constitutional: He appears well-developed.   HENT:   Head: Normocephalic.   Neck: Normal carotid pulses and no JVD present. No tracheal tenderness present. Carotid bruit is not present. No tracheal deviation and no edema present.   Cardiovascular: Regular rhythm and normal pulses.    Murmur heard.   Systolic murmur is present with a grade of 2/6   Pulmonary/Chest: Effort normal. No stridor.   Abdominal: Soft.   Neurological: He is alert. He has normal strength. No cranial nerve deficit or sensory deficit.   Skin: Skin " is warm.   Psychiatric: He has a normal mood and affect. His speech is normal and behavior is normal.       Results Review:       ECG 12 Lead  Date/Time: 10/30/2017 9:44 AM  Performed by: BECKI SELF  Authorized by: BECKI SELF   Comparison: compared with previous ECG from 7/6/2017  Similar to previous ECG  Rhythm: paced  Rate: normal  Clinical impression: abnormal ECG            Assessment/Plan   Patient Active Problem List   Diagnosis   • Metastatic melanoma   • HLD (hyperlipidemia)   • Non-ischemic cardiomyopathy   • AICD (automatic cardioverter/defibrillator) present       No palpitations. No significant pedal edema. Compliant with medications and diet. Latest labs and medications reviewed.    Plan:      No additional cardiac testing required at this point in time.   Continue same medications  Monitor for any signs of bleeding including red or dark stools. Fall precautions.  Patient is asked to monitor BP at home or work, several times per month and return with written values at next office visit.   Monitor device function regularly per established schedule or PRN   Close follow up with you as scheduled.  Intensive factor modifications.  See order list.    Counseled regarding disease appropriate diet, fluid, caffeine, stimulants and sodium intake as well as importance of compliance to diet, exercise and regular follow up.  Avoid NSAIDS and COX2 inhibitors. Use Acetaminophen PRN.    Return in about 6 months (around 4/30/2018).

## 2017-11-06 NOTE — PROGRESS NOTES
Bi-V AICD Evaluation Report  Clinic Interrogtion    November 6, 2017    Primary Cardiologist: Lexi  : Guidant Model: Energen  Implant date: 4/29/13    Reason for evaluation: routine  Indication for AICD: nonischemic cardiomyopathy    Measurements  Atrial sensing:  P wave: 1.2 mV  Atrial threshold: 0.8 V @ 0.4 ms  Atrial lead impedance: 706 ohms  Ventricular sensing:  R wave: >25 mV  RV Threshold:  1.4 V @ 0.4 ms  RV Impedance:  546 ohms  Shock impedance:  59 ohms   LV Threshold:  2.3 V @ 2.0 ms  LV Impedance:  854 ohms      Diagnostic Data  Atrial paced: 84 %  Ventricular paced: 97 %  Other: AF and SVT noted; pt anticoagulated  Battery status: satisfactory         Final Parameters  Mode: DDD     Lower rate: 70 bpm   Upper rate: 130 bpm  AV Delay: 200 ms paced    150 ms sensed   Atrial - Amplitude: 1.6 V   Pulse width: 0.4 ms   Sensitivity: 0.25 mV   Ventricular:  RV Amplitude: 3 V @ 0.4 ms   Sensitivity: 0.6 mV            LV Amplitude:  2.8V @ 2ms  Changes made: n/a  Conclusions: normal AICD function    Follow up: 3 months

## 2017-11-07 NOTE — PROGRESS NOTES
I have reviewed the notes, assessments, and/or procedures performed by  Marine Mojica PA-C  , I concur with her  documentation of   Wilmar Reyes Jr..

## 2018-01-30 ENCOUNTER — CLINICAL SUPPORT NO REQUIREMENTS (OUTPATIENT)
Dept: CARDIOLOGY | Facility: CLINIC | Age: 73
End: 2018-01-30

## 2018-01-30 DIAGNOSIS — Z95.810 AICD (AUTOMATIC CARDIOVERTER/DEFIBRILLATOR) PRESENT: ICD-10-CM

## 2018-01-30 PROCEDURE — 93284 PRGRMG EVAL IMPLANTABLE DFB: CPT | Performed by: PHYSICIAN ASSISTANT

## 2018-05-01 ENCOUNTER — OFFICE VISIT (OUTPATIENT)
Dept: CARDIOLOGY | Facility: CLINIC | Age: 73
End: 2018-05-01

## 2018-05-01 VITALS
OXYGEN SATURATION: 100 % | HEART RATE: 72 BPM | BODY MASS INDEX: 28.04 KG/M2 | SYSTOLIC BLOOD PRESSURE: 118 MMHG | DIASTOLIC BLOOD PRESSURE: 58 MMHG | WEIGHT: 185 LBS | HEIGHT: 68 IN

## 2018-05-01 DIAGNOSIS — R06.00 DYSPNEA, UNSPECIFIED TYPE: ICD-10-CM

## 2018-05-01 DIAGNOSIS — I42.8 NON-ISCHEMIC CARDIOMYOPATHY (HCC): Primary | ICD-10-CM

## 2018-05-01 DIAGNOSIS — Z95.810 AICD (AUTOMATIC CARDIOVERTER/DEFIBRILLATOR) PRESENT: ICD-10-CM

## 2018-05-01 DIAGNOSIS — M54.41 CHRONIC RIGHT-SIDED LOW BACK PAIN WITH RIGHT-SIDED SCIATICA: ICD-10-CM

## 2018-05-01 DIAGNOSIS — G89.29 CHRONIC RIGHT-SIDED LOW BACK PAIN WITH RIGHT-SIDED SCIATICA: ICD-10-CM

## 2018-05-01 DIAGNOSIS — C43.9 METASTATIC MELANOMA (HCC): ICD-10-CM

## 2018-05-01 DIAGNOSIS — E78.2 MIXED HYPERLIPIDEMIA: ICD-10-CM

## 2018-05-01 DIAGNOSIS — R06.09 DOE (DYSPNEA ON EXERTION): ICD-10-CM

## 2018-05-01 PROCEDURE — 93000 ELECTROCARDIOGRAM COMPLETE: CPT | Performed by: INTERNAL MEDICINE

## 2018-05-01 PROCEDURE — 99214 OFFICE O/P EST MOD 30 MIN: CPT | Performed by: INTERNAL MEDICINE

## 2018-05-01 RX ORDER — FOLIC ACID 1 MG/1
1 TABLET ORAL DAILY
COMMUNITY

## 2018-05-01 NOTE — PROGRESS NOTES
Wilmar Reyes Jr.  [id number]  1945  72 y.o.  male    Referring Provider: Alex Haynes MD    Reason for Follow-up Visit:  Routine follow up  nonischemic cardiomyopathy   Paroxysmal atrial fibrillation     Subjective    Mild chronic exertional shortness of breath on exertion relieved with rest  No significant cough or wheezing  Going on for several months  No palpitations  No associated chest pain  No significant pedal edema  No fever or chills  No significant expectoration  No hemoptysis  No presyncope or syncope   12/22/16 echo  Left ventricular function is normal. Estimated EF = 60%.  Mild pulmonary hypertension  Easy fatiguability   Chronic low back pain        History of present illness:  Wilmar Reyes Jr. is a 72 y.o. yo male with history of congestive heart failure  who presents today for   Chief Complaint   Patient presents with   • Atrial Fibrillation     6 MO    • Congestive Heart Failure   .    History  Past Medical History:   Diagnosis Date   • AICD (automatic cardioverter/defibrillator) present    • CAD (coronary artery disease)    • CAD in native artery     7/2015- ECHO: EF 50-55% (increased from 2/2013- 25%), abnormal LV diastolic function. 8/2015- LexiScan: negative for ischemia/scar 2/2013- Cath: Mild CAD, non-ischemic cardiomyopathy.   • Cancer    • Chest pain, unspecified    • Chronic combined systolic and diastolic congestive heart failure      7/2015- ECHO: EF 50-55% (increased from 2/2013- 25%), abnormal LV diastolic function.as    • COPD (chronic obstructive pulmonary disease)    • FH: chemotherapy    • HLD (hyperlipidemia)    • Hyperlipidemia, mixed    • Hypertension    • Hypertension, benign    • Immune deficiency disorder    • Left bundle branch block    • Myocardial infarction    • Non-ischemic cardiomyopathy     LifeVest   • Status post implantation of automatic cardioverter/defibrillator (AICD)      Placed 4/2013. Interrogated by [facility].   ,   Past Surgical History:    Procedure Laterality Date   • A-V CARDIAC PACEMAKER INSERTION     • CARDIAC DEFIBRILLATOR PLACEMENT Left 02/05/2013    Sev dil nonischemic cardiomyopathy. Mild CAD.   • CORONARY ANGIOPLASTY     • LYMPH NODE BIOPSY      left leg lymp node bx 2/2016   • OTHER SURGICAL HISTORY  04/29/2013    BS BiV ICD Implantation   • SKIN CANCER EXCISION     ,   Family History   Problem Relation Age of Onset   • Heart disease Mother    • Heart disease Father    ,   Social History   Substance Use Topics   • Smoking status: Former Smoker     Years: 32.00     Types: Cigarettes   • Smokeless tobacco: Never Used   • Alcohol use No   ,     Medications  Current Outpatient Prescriptions   Medication Sig Dispense Refill   • acetaminophen (TYLENOL) 325 MG tablet Take 650 mg by mouth Every 6 (Six) Hours As Needed for Mild Pain (1-3).     • apixaban (ELIQUIS) 5 MG tablet tablet Take 1 tablet by mouth 2 (Two) Times a Day. 60 tablet 11   • aspirin 81 MG tablet Take 81 mg by mouth Daily.     • budesonide-formoterol (SYMBICORT) 160-4.5 MCG/ACT inhaler Inhale 2 puffs 2 (Two) Times a Day.     • carvedilol (COREG) 25 MG tablet Take 25 mg by mouth 2 (Two) Times a Day With Meals.     • fenofibrate (TRICOR) 145 MG tablet Take 145 mg by mouth Daily.     • folic acid (FOLVITE) 1 MG tablet Take 1 mg by mouth Daily.     • furosemide (LASIX) 20 MG tablet Take 20 mg by mouth 2 (Two) Times a Day.     • HYDROcodone-acetaminophen (NORCO) 7.5-325 MG per tablet Take 1 tablet by mouth Every 6 (Six) Hours As Needed for moderate pain (4-6).     • ipratropium-albuterol (COMBIVENT RESPIMAT)  MCG/ACT inhaler Inhale 1 puff 4 (Four) Times a Day As Needed for wheezing.     • lisinopril (PRINIVIL,ZESTRIL) 40 MG tablet Take 40 mg by mouth Daily.     • NAPROXEN PO Take  by mouth As Needed.     • nitroglycerin (NITROSTAT) 0.4 MG SL tablet Place 0.4 mg under the tongue Every 5 (Five) Minutes As Needed for chest pain. Take no more than 3 doses in 15 minutes.     •  "ondansetron ODT (ZOFRAN-ODT) 4 MG disintegrating tablet Take 1 tablet by mouth 4 (Four) Times a Day As Needed for Nausea or Vomiting. 20 tablet 0   • POTASSIUM CHLORIDE ER PO Take 10 mEq by mouth.     • rosuvastatin (CRESTOR) 40 MG tablet Take 40 mg by mouth Daily.       No current facility-administered medications for this visit.        Allergies:  Review of patient's allergies indicates no known allergies.    Review of Systems  Review of Systems   Constitution: Positive for weakness and malaise/fatigue. Negative for chills, decreased appetite, fever, weight gain and weight loss.   HENT: Negative for nosebleeds.    Eyes: Negative for visual disturbance.   Cardiovascular: Positive for dyspnea on exertion and leg swelling. Negative for chest pain, near-syncope, orthopnea, palpitations, paroxysmal nocturnal dyspnea and syncope.   Respiratory: Positive for shortness of breath. Negative for cough, hemoptysis and snoring.    Endocrine: Negative for cold intolerance and heat intolerance.   Hematologic/Lymphatic: Negative for bleeding problem. Does not bruise/bleed easily.   Skin: Negative for rash.   Musculoskeletal: Positive for arthritis and back pain. Negative for falls.   Gastrointestinal: Negative for abdominal pain, constipation, diarrhea, heartburn, melena, nausea and vomiting.   Genitourinary: Negative for hematuria.   Neurological: Negative for dizziness, headaches and light-headedness.   Psychiatric/Behavioral: Negative for altered mental status.   Allergic/Immunologic: Negative for persistent infections.       Objective     Physical Exam:  /58   Pulse 72   Ht 172.7 cm (68\")   Wt 83.9 kg (185 lb)   SpO2 100%   BMI 28.13 kg/m²   Physical Exam   Constitutional: He appears well-developed.   HENT:   Head: Normocephalic.   Neck: Normal carotid pulses and no JVD present. No tracheal tenderness present. Carotid bruit is not present. No tracheal deviation and no edema present.   Cardiovascular: Regular rhythm " and normal pulses.    Murmur heard.   Systolic murmur is present with a grade of 2/6   Pulmonary/Chest: Effort normal. No stridor.   Abdominal: Soft.   Neurological: He is alert. He has normal strength. No cranial nerve deficit or sensory deficit.   Skin: Skin is warm.   Psychiatric: He has a normal mood and affect. His speech is normal and behavior is normal.       Results Review:  Results for orders placed during the hospital encounter of 12/29/16   Adult Transthoracic Echo Limited    Narrative · Left ventricular function is normal. Estimated EF = 60%.  · Mild pulmonary hypertension             ECG 12 Lead  Date/Time: 5/1/2018 12:06 PM  Performed by: BECKI SELF  Authorized by: BECKI SELF   Comparison: compared with previous ECG from 10/30/2017  Comparison to previous ECG: premature ventricular contraction is new  Rhythm: paced  Rate: normal  QRS axis: normal  Clinical impression: abnormal ECG            Assessment/Plan   Patient Active Problem List   Diagnosis   • Metastatic melanoma   • HLD (hyperlipidemia)   • Non-ischemic cardiomyopathy   • AICD (automatic cardioverter/defibrillator) present   • Chronic right-sided low back pain with right-sided sciatica   • CUEVAS (dyspnea on exertion)       No palpitations. No significant pedal edema. Compliant with medications and diet. Latest labs and medications reviewed.    Plan:      Monitor cardiac rhythm device function regularly per established schedule or PRN, [facility] monitors    Low salt/ HTN/ Heart healthy carbohydrate restricted cardiac diet as applicable to this patient's current diagnoses.   This handout has relevant information regarding shopping for food, preparing meals, what to eat at restaurants, tracking of food intake, information regarding sodium intake and salt content, how to read food labels, knowing what to eat, tips reagarding physical activity, calorie count and calorie expenditure. What foods to avoid. Information regarding alcoholic drinks along  "with \"good\" and \"bad\" fats.  Relevant printed educational materials given pertinent to above diagnoses       The patient is advised to reduce or avoid caffeine or other cardiac stimulants.      Monitor for any signs of bleeding including red or dark stools. Fall precautions.   Patient is asked to monitor BP at home or work, several times per month and return with written values at next office visit.     The patient was advised that NSAID-type medications have three  very important potential side effects: cardiovascular complications, gastrointestinal irritation including hemorrhage and renal injuries.  Do not use anti-inflammatories such as Motrin/ibuprofen, Alleve/naprosyn, Mobic and like medications Use Tylenol instead.The patient expresses understanding of these issues and questions were answered.   Monitor for any signs of bleeding including red or dark stools. Fall precautions.       Orders Placed This Encounter   Procedures   • ECG 12 Lead     This order was created via procedure documentation   • Adult Transthoracic Echo Complete W/ Cont if Necessary Per Protocol     Standing Status:   Future     Standing Expiration Date:   5/1/2019     Order Specific Question:   Reason for exam?     Answer:   Dyspnea     Order Specific Question:   Reason for exam?     Answer:   Heart Failure, Cardiomyopathy, or Sytemic or Pulmonary Hypertension     Monitor CBC, CMP and Lipid Panel by primary      Return in about 6 months (around 11/1/2018).                "

## 2018-05-15 ENCOUNTER — HOSPITAL ENCOUNTER (OUTPATIENT)
Dept: CARDIOLOGY | Facility: HOSPITAL | Age: 73
Discharge: HOME | End: 2018-05-15
Attending: INTERNAL MEDICINE | Admitting: INTERNAL MEDICINE

## 2018-05-15 DIAGNOSIS — Z95.810 AICD (AUTOMATIC CARDIOVERTER/DEFIBRILLATOR) PRESENT: ICD-10-CM

## 2018-05-15 DIAGNOSIS — R06.00 DYSPNEA, UNSPECIFIED TYPE: ICD-10-CM

## 2018-05-15 PROCEDURE — 25010000002 PERFLUTREN 6.52 MG/ML SUSPENSION: Performed by: INTERNAL MEDICINE

## 2018-05-15 PROCEDURE — 93306 TTE W/DOPPLER COMPLETE: CPT | Performed by: INTERNAL MEDICINE

## 2018-05-15 PROCEDURE — 93306 TTE W/DOPPLER COMPLETE: CPT

## 2018-05-15 RX ADMIN — PERFLUTREN 8.48 MG: 6.52 INJECTION, SUSPENSION INTRAVENOUS at 09:11

## 2018-05-18 LAB
BH CV ECHO MEAS - AI DEC SLOPE: 133 CM/SEC^2
BH CV ECHO MEAS - AI MAX PG: 44.9 MMHG
BH CV ECHO MEAS - AI MAX VEL: 335 CM/SEC
BH CV ECHO MEAS - AI P1/2T: 737.7 MSEC
BH CV ECHO MEAS - AO MAX PG (FULL): 5.7 MMHG
BH CV ECHO MEAS - AO MAX PG: 12.4 MMHG
BH CV ECHO MEAS - AO MEAN PG (FULL): 2 MMHG
BH CV ECHO MEAS - AO MEAN PG: 6 MMHG
BH CV ECHO MEAS - AO ROOT AREA (BSA CORRECTED): 1.5
BH CV ECHO MEAS - AO ROOT AREA: 7.1 CM^2
BH CV ECHO MEAS - AO ROOT DIAM: 3 CM
BH CV ECHO MEAS - AO V2 MAX: 176 CM/SEC
BH CV ECHO MEAS - AO V2 MEAN: 109 CM/SEC
BH CV ECHO MEAS - AO V2 VTI: 37.1 CM
BH CV ECHO MEAS - AVA(I,A): 3.3 CM^2
BH CV ECHO MEAS - AVA(I,D): 3.3 CM^2
BH CV ECHO MEAS - AVA(V,A): 3 CM^2
BH CV ECHO MEAS - AVA(V,D): 3 CM^2
BH CV ECHO MEAS - BSA(HAYCOCK): 2 M^2
BH CV ECHO MEAS - BSA: 2 M^2
BH CV ECHO MEAS - BZI_BMI: 28.1 KILOGRAMS/M^2
BH CV ECHO MEAS - BZI_METRIC_HEIGHT: 172.7 CM
BH CV ECHO MEAS - BZI_METRIC_WEIGHT: 83.9 KG
BH CV ECHO MEAS - CONTRAST EF 4CH: 50.5 ML/M^2
BH CV ECHO MEAS - EDV(CUBED): 148.9 ML
BH CV ECHO MEAS - EDV(MOD-SP4): 202 ML
BH CV ECHO MEAS - EDV(TEICH): 135.3 ML
BH CV ECHO MEAS - EF(CUBED): 63.1 %
BH CV ECHO MEAS - EF(MOD-BP): 65 %
BH CV ECHO MEAS - EF(MOD-SP4): 50.5 %
BH CV ECHO MEAS - EF(TEICH): 54.2 %
BH CV ECHO MEAS - ESV(CUBED): 54.9 ML
BH CV ECHO MEAS - ESV(MOD-SP4): 99.9 ML
BH CV ECHO MEAS - ESV(TEICH): 62 ML
BH CV ECHO MEAS - FS: 28.3 %
BH CV ECHO MEAS - IVS/LVPW: 1
BH CV ECHO MEAS - IVSD: 1.5 CM
BH CV ECHO MEAS - LA DIMENSION: 4 CM
BH CV ECHO MEAS - LA/AO: 1.3
BH CV ECHO MEAS - LAT PEAK E' VEL: 6.5 CM/SEC
BH CV ECHO MEAS - LV DIASTOLIC VOL/BSA (35-75): 102.2 ML/M^2
BH CV ECHO MEAS - LV MASS(C)D: 352.5 GRAMS
BH CV ECHO MEAS - LV MASS(C)DI: 178.3 GRAMS/M^2
BH CV ECHO MEAS - LV MAX PG: 6.7 MMHG
BH CV ECHO MEAS - LV MEAN PG: 4 MMHG
BH CV ECHO MEAS - LV SYSTOLIC VOL/BSA (12-30): 50.5 ML/M^2
BH CV ECHO MEAS - LV V1 MAX: 129 CM/SEC
BH CV ECHO MEAS - LV V1 MEAN: 86 CM/SEC
BH CV ECHO MEAS - LV V1 VTI: 29.2 CM
BH CV ECHO MEAS - LVIDD: 5.3 CM
BH CV ECHO MEAS - LVIDS: 3.8 CM
BH CV ECHO MEAS - LVLD AP4: 9.3 CM
BH CV ECHO MEAS - LVLS AP4: 8.6 CM
BH CV ECHO MEAS - LVOT AREA (M): 4.2 CM^2
BH CV ECHO MEAS - LVOT AREA: 4.2 CM^2
BH CV ECHO MEAS - LVOT DIAM: 2.3 CM
BH CV ECHO MEAS - LVPWD: 1.5 CM
BH CV ECHO MEAS - MED PEAK E' VEL: 6.2 CM/SEC
BH CV ECHO MEAS - MV A MAX VEL: 105 CM/SEC
BH CV ECHO MEAS - MV DEC TIME: 0.21 SEC
BH CV ECHO MEAS - MV E MAX VEL: 90.7 CM/SEC
BH CV ECHO MEAS - MV E/A: 0.86
BH CV ECHO MEAS - RAP SYSTOLE: 5 MMHG
BH CV ECHO MEAS - RVDD: 3.9 CM
BH CV ECHO MEAS - RVSP: 39.1 MMHG
BH CV ECHO MEAS - SI(AO): 132.6 ML/M^2
BH CV ECHO MEAS - SI(CUBED): 47.5 ML/M^2
BH CV ECHO MEAS - SI(LVOT): 61.4 ML/M^2
BH CV ECHO MEAS - SI(MOD-SP4): 51.6 ML/M^2
BH CV ECHO MEAS - SI(TEICH): 37.1 ML/M^2
BH CV ECHO MEAS - SV(AO): 262.2 ML
BH CV ECHO MEAS - SV(CUBED): 94 ML
BH CV ECHO MEAS - SV(LVOT): 121.3 ML
BH CV ECHO MEAS - SV(MOD-SP4): 102.1 ML
BH CV ECHO MEAS - SV(TEICH): 73.4 ML
BH CV ECHO MEAS - TR MAX VEL: 292 CM/SEC
BH CV ECHO MEASUREMENTS AVERAGE E/E' RATIO: 14.28
LEFT ATRIUM VOLUME INDEX: 21.2 ML/M2
LEFT ATRIUM VOLUME: 42 CM3
LV EF 2D ECHO EST: 60 %

## 2018-10-23 ENCOUNTER — TELEPHONE (OUTPATIENT)
Dept: CARDIOLOGY | Facility: CLINIC | Age: 73
End: 2018-10-23

## 2018-10-23 NOTE — TELEPHONE ENCOUNTER
PATIENT IS HAVING CATARACT SURGERY, IT IS PLANNED ON 11/1/2018 THEY STATED HE WILL NOT NEED TO STOP BLOOD THINNERS..    PLEASE ADVISE.

## 2018-10-25 NOTE — TELEPHONE ENCOUNTER
Acceptable cardiovascular risk of planned procedure.  Can proceed with surgery with usual caution and perioperative hemodynamic and cardiac rhythm monitoring.

## 2018-11-21 ENCOUNTER — OFFICE VISIT (OUTPATIENT)
Dept: CARDIOLOGY | Facility: CLINIC | Age: 73
End: 2018-11-21

## 2018-11-21 VITALS
HEART RATE: 76 BPM | SYSTOLIC BLOOD PRESSURE: 130 MMHG | BODY MASS INDEX: 29.1 KG/M2 | WEIGHT: 192 LBS | OXYGEN SATURATION: 99 % | DIASTOLIC BLOOD PRESSURE: 65 MMHG | HEIGHT: 68 IN

## 2018-11-21 DIAGNOSIS — C43.9 METASTATIC MELANOMA (HCC): ICD-10-CM

## 2018-11-21 DIAGNOSIS — R07.2 PRECORDIAL CHEST PAIN: ICD-10-CM

## 2018-11-21 DIAGNOSIS — R06.09 DOE (DYSPNEA ON EXERTION): ICD-10-CM

## 2018-11-21 DIAGNOSIS — I42.8 NON-ISCHEMIC CARDIOMYOPATHY (HCC): ICD-10-CM

## 2018-11-21 DIAGNOSIS — E78.2 MIXED HYPERLIPIDEMIA: ICD-10-CM

## 2018-11-21 DIAGNOSIS — Z86.19 HX OF SCABIES: ICD-10-CM

## 2018-11-21 DIAGNOSIS — M54.41 CHRONIC RIGHT-SIDED LOW BACK PAIN WITH RIGHT-SIDED SCIATICA: Primary | ICD-10-CM

## 2018-11-21 DIAGNOSIS — G89.29 CHRONIC RIGHT-SIDED LOW BACK PAIN WITH RIGHT-SIDED SCIATICA: Primary | ICD-10-CM

## 2018-11-21 DIAGNOSIS — Z95.810 AICD (AUTOMATIC CARDIOVERTER/DEFIBRILLATOR) PRESENT: ICD-10-CM

## 2018-11-21 PROCEDURE — 99214 OFFICE O/P EST MOD 30 MIN: CPT | Performed by: INTERNAL MEDICINE

## 2018-11-21 NOTE — PROGRESS NOTES
Wilmar Reyes Jr.  [id number]  1945  73 y.o.  male    Referring Provider: Alex Haynes MD    Reason for Follow-up Visit:  Routine follow up.      Subjective    Moderate chronic exertional shortness of breath on exertion relieved with rest  No significant cough or wheezing  Going on for several months  No palpitations    Now has associated chest pain  No significant pedal edema  No fever or chills    No significant expectoration  No hemoptysis  No presyncope or syncope     Chest pain with exertion, moderate substernal, pressure like. Lasts less than 5 minutes  Started 6-8 weeks ago  Occurs once or twice a week  No associated diaphoresis  No associated nausea    No radiation  Precipitated with exertion  Relieved with rest  Not positional  No change with intake of food or antacids  No change with breathing    Joint pain in small, medium and large joints  Chronic low back pain        History of present illness:  Wilmar Reeys Jr. is a 73 y.o. yo male with history of congestive heart failure  who presents today for   Chief Complaint   Patient presents with   • Atrial Fibrillation     6 Month follow up    • Congestive Heart Failure   .    History  Past Medical History:   Diagnosis Date   • AICD (automatic cardioverter/defibrillator) present    • CAD (coronary artery disease)    • CAD in native artery     7/2015- ECHO: EF 50-55% (increased from 2/2013- 25%), abnormal LV diastolic function. 8/2015- LexiScan: negative for ischemia/scar 2/2013- Cath: Mild CAD, non-ischemic cardiomyopathy.   • Cancer (CMS/HCC)    • Chest pain, unspecified    • Chronic combined systolic and diastolic congestive heart failure (CMS/HCC)      7/2015- ECHO: EF 50-55% (increased from 2/2013- 25%), abnormal LV diastolic function.as    • COPD (chronic obstructive pulmonary disease) (CMS/HCC)    • FH: chemotherapy    • HLD (hyperlipidemia)    • Hx of scabies    • Hyperlipidemia, mixed    • Hypertension    • Hypertension, benign    • Immune  deficiency disorder (CMS/HCC)    • Left bundle branch block    • Myocardial infarction (CMS/HCC)    • Non-ischemic cardiomyopathy (CMS/HCC)     LifeVest   • Status post implantation of automatic cardioverter/defibrillator (AICD)      Placed 4/2013. Interrogated by [facility].   ,   Past Surgical History:   Procedure Laterality Date   • A-V CARDIAC PACEMAKER INSERTION     • CARDIAC DEFIBRILLATOR PLACEMENT Left 02/05/2013    Sev dil nonischemic cardiomyopathy. Mild CAD.   • CORONARY ANGIOPLASTY     • LYMPH NODE BIOPSY      left leg lymp node bx 2/2016   • OTHER SURGICAL HISTORY  04/29/2013    BS BiV ICD Implantation   • SKIN CANCER EXCISION     ,   Family History   Problem Relation Age of Onset   • Heart disease Mother    • Heart disease Father    ,   Social History     Tobacco Use   • Smoking status: Former Smoker     Years: 32.00     Types: Cigarettes   • Smokeless tobacco: Never Used   Substance Use Topics   • Alcohol use: No   • Drug use: No   ,     Medications  Current Outpatient Medications   Medication Sig Dispense Refill   • acetaminophen (TYLENOL) 325 MG tablet Take 650 mg by mouth Every 6 (Six) Hours As Needed for Mild Pain (1-3).     • apixaban (ELIQUIS) 5 MG tablet tablet Take 1 tablet by mouth 2 (Two) Times a Day. 60 tablet 11   • aspirin 81 MG tablet Take 81 mg by mouth Daily.     • budesonide-formoterol (SYMBICORT) 160-4.5 MCG/ACT inhaler Inhale 2 puffs 2 (Two) Times a Day.     • carvedilol (COREG) 25 MG tablet Take 25 mg by mouth 2 (Two) Times a Day With Meals.     • fenofibrate (TRICOR) 145 MG tablet Take 145 mg by mouth Daily.     • folic acid (FOLVITE) 1 MG tablet Take 1 mg by mouth Daily.     • furosemide (LASIX) 20 MG tablet Take 20 mg by mouth 2 (Two) Times a Day.     • HYDROcodone-acetaminophen (NORCO) 7.5-325 MG per tablet Take 1 tablet by mouth Every 6 (Six) Hours As Needed for moderate pain (4-6).     • ipratropium-albuterol (COMBIVENT RESPIMAT)  MCG/ACT inhaler Inhale 1 puff 4  (Four) Times a Day As Needed for wheezing.     • lisinopril (PRINIVIL,ZESTRIL) 40 MG tablet Take 40 mg by mouth Daily.     • NAPROXEN PO Take  by mouth As Needed.     • nitroglycerin (NITROSTAT) 0.4 MG SL tablet Place 0.4 mg under the tongue Every 5 (Five) Minutes As Needed for chest pain. Take no more than 3 doses in 15 minutes.     • ondansetron ODT (ZOFRAN-ODT) 4 MG disintegrating tablet Take 1 tablet by mouth 4 (Four) Times a Day As Needed for Nausea or Vomiting. 20 tablet 0   • POTASSIUM CHLORIDE ER PO Take 10 mEq by mouth.     • rosuvastatin (CRESTOR) 40 MG tablet Take 40 mg by mouth Daily.       No current facility-administered medications for this visit.        Allergies:  Patient has no known allergies.    Review of Systems  Review of Systems   Constitution: Positive for weakness and malaise/fatigue. Negative for chills, decreased appetite, fever, weight gain and weight loss.   HENT: Negative for nosebleeds.    Eyes: Negative for visual disturbance.   Cardiovascular: Positive for chest pain and dyspnea on exertion. Negative for leg swelling, near-syncope, orthopnea, palpitations, paroxysmal nocturnal dyspnea and syncope.   Respiratory: Positive for shortness of breath. Negative for cough, hemoptysis and snoring.    Endocrine: Negative for cold intolerance and heat intolerance.   Hematologic/Lymphatic: Negative for bleeding problem. Does not bruise/bleed easily.   Skin: Negative for rash.   Musculoskeletal: Positive for arthritis, back pain and stiffness. Negative for falls.   Gastrointestinal: Negative for abdominal pain, constipation, diarrhea, heartburn, melena, nausea and vomiting.   Genitourinary: Negative for hematuria.   Neurological: Negative for dizziness, headaches and light-headedness.   Psychiatric/Behavioral: Negative for altered mental status.   Allergic/Immunologic: Negative for persistent infections.       Objective     Physical Exam:  /65 (BP Location: Left arm, Patient Position:  "Sitting, Cuff Size: Adult)   Pulse 76   Ht 172.7 cm (68\")   Wt 87.1 kg (192 lb)   SpO2 99%   BMI 29.19 kg/m²   Physical Exam   Constitutional: He appears well-developed.   HENT:   Head: Normocephalic.   Neck: Normal carotid pulses and no JVD present. No tracheal tenderness present. Carotid bruit is not present. No tracheal deviation and no edema present.   Cardiovascular: Regular rhythm and normal pulses.   Murmur heard.   Systolic murmur is present with a grade of 2/6.  Pulmonary/Chest: Effort normal. No stridor.   Abdominal: Soft.   Neurological: He is alert. He has normal strength. No cranial nerve deficit or sensory deficit.   Skin: Skin is warm.   Psychiatric: He has a normal mood and affect. His speech is normal and behavior is normal.       Results Review:     Procedures    Assessment/Plan   Patient Active Problem List   Diagnosis   • Metastatic melanoma (CMS/HCC)   • HLD (hyperlipidemia)   • Non-ischemic cardiomyopathy (CMS/HCC)   • AICD (automatic cardioverter/defibrillator) present   • Chronic right-sided low back pain with right-sided sciatica   • CUEVAS (dyspnea on exertion)   • Hx of scabies   • Precordial chest pain       No palpitations. No significant pedal edema. Compliant with medications and diet. Latest labs and medications reviewed.      Plan        Monitor cardiac rhythm device function regularly per established schedule or PRN ONCE [facility] TO CHECK   HE WANTS THIS    Orders Placed This Encounter   Procedures   • Stress Test With Myocardial Perfusion One Day     Standing Status:   Future     Standing Expiration Date:   11/21/2019     Order Specific Question:   What stress agent will be used?     Answer:   Regadenoson (Lexiscan)     Order Specific Question:   Difficulty walking criteria?     Answer:   Musculoskeletal (hips, knees, feet, back, amputee)     Order Specific Question:   Reason for exam?     Answer:   Chest Pain " "    xxxxxxxxxxxxxxxxxxxxxxxxxxxxxxxxxxxxxxxxxxxxxxxxxxxxxxxxxxxxxxxxxxxxxxxxxxxxxxxxxxxxxxxxxxxxxxxxxxxxxxxxxxxxxxxxxxxxxxxxxxxxxxxxxxxxxxxxxxxxxxxxxxxxxxxxxxxxxxxxxxxxxxxxxxxxxxxxxxxxxxxxxxxxxxxxxxxxxxxxxxxxxxxxxxxxxxxxxxxxxxxxxxxxxxxxxxxxxxxxxxxxxxxx  Health maintenance    Low salt/ HTN/ Heart healthy carbohydrate restricted cardiac diet as applicable to this patient's current diagnoses.   This handout has relevant information regarding shopping for food, preparing meals, what to eat at restaurants, tracking of food intake, information regarding sodium intake and salt content, how to read food labels, knowing what to eat, tips reagarding physical activity, calorie count and calorie expenditure. What foods to avoid. Information regarding alcoholic drinks along with \"good\" and \"bad\" fats.  Reiterated prior recommendations     The patient is advised to reduce or avoid caffeine or other cardiac stimulants.     The patient was advised that NSAID-type medications have three  very important potential side effects: cardiovascular complications, gastrointestinal irritation including hemorrhage and renal injuries.  Do not use anti-inflammatories such as Motrin/ibuprofen, Alleve/naprosyn, Mobic and like medications Use Tylenol instead.The patient expresses understanding of these issues and questions were answered.   Monitor for any signs of bleeding including red or dark stools. Fall precautions.       Monitor for any signs of bleeding including red or dark stools. Fall precautions.   Patient is asked to monitor BP at home or work, several times per month and return with written values at next office visit.     Advised staying uptodate with immunizations per established standard guidelines.      Offered to give patient  a copy of my notes and relevant tests/ prior ECG etc for the patient to review and follow specific advise and relevant findings if any, prognosis, along with my current and future plans.      Questions were " encouraged, asked and answered to the patient's  understanding and satisfaction. Questions if any regarding current medications and side effects, need for refills and importance of compliance to medications stressed.    Reviewed available prior notes, consults, prior visits, laboratory findings, radiology and cardiology relevant reports. Updated chart as applicable. I have reviewed the patient's medical history in detail and updated the computerized patient record as relevant.      Updated patient regarding any new or relevant abnormalities on review of records or any new findings on physical exam. Mentioned to patient about purpose of visit and desirable health short and long term goals and objectives.        xxxxxxxxxxxxxxxxxxxxxxxxxxxxxxxxxxxxxxxxxxxxxxxxxxxxxxxxxxxxxxxxxxxxxxxxxxxxxxxxxxxxxxxxxxxxxxxxxxxxxxxxxxxxxxxxxxxxxxxxxxxxxxxxxxxxxxxxxxxxxxxxxxxxxxxxxxxxxxxxxxxxxxxxxxxxxxxxxxxxxxxxxxxxxxxxxxxxxxxxxxxxxxxxxxxxxxxxxxxxxxxxxxxxxxxxxxxxxxxxxxxxxxxx  Return in about 6 weeks (around 1/2/2019).

## 2018-12-06 ENCOUNTER — HOSPITAL ENCOUNTER (OUTPATIENT)
Dept: CARDIOLOGY | Facility: HOSPITAL | Age: 73
Discharge: HOME | End: 2018-12-06
Attending: INTERNAL MEDICINE

## 2018-12-06 VITALS
DIASTOLIC BLOOD PRESSURE: 69 MMHG | HEIGHT: 68 IN | SYSTOLIC BLOOD PRESSURE: 123 MMHG | WEIGHT: 192.02 LBS | HEART RATE: 70 BPM | BODY MASS INDEX: 29.1 KG/M2

## 2018-12-06 DIAGNOSIS — R07.2 PRECORDIAL CHEST PAIN: ICD-10-CM

## 2018-12-06 PROCEDURE — 93017 CV STRESS TEST TRACING ONLY: CPT

## 2018-12-06 PROCEDURE — 78452 HT MUSCLE IMAGE SPECT MULT: CPT

## 2018-12-06 PROCEDURE — 0 TECHNETIUM SESTAMIBI: Performed by: INTERNAL MEDICINE

## 2018-12-06 PROCEDURE — 93018 CV STRESS TEST I&R ONLY: CPT | Performed by: INTERNAL MEDICINE

## 2018-12-06 PROCEDURE — 78452 HT MUSCLE IMAGE SPECT MULT: CPT | Performed by: INTERNAL MEDICINE

## 2018-12-06 PROCEDURE — 25010000002 REGADENOSON 0.4 MG/5ML SOLUTION: Performed by: INTERNAL MEDICINE

## 2018-12-06 PROCEDURE — A9500 TC99M SESTAMIBI: HCPCS | Performed by: INTERNAL MEDICINE

## 2018-12-06 RX ADMIN — REGADENOSON 0.4 MG: 0.08 INJECTION, SOLUTION INTRAVENOUS at 09:59

## 2018-12-06 RX ADMIN — TECHNETIUM TC 99M SESTAMIBI 1 DOSE: 1 INJECTION INTRAVENOUS at 08:45

## 2018-12-06 RX ADMIN — TECHNETIUM TC 99M SESTAMIBI 1 DOSE: 1 INJECTION INTRAVENOUS at 10:00

## 2018-12-07 LAB
BH CV NUCLEAR PRIOR STUDY: 3
BH CV STRESS BP STAGE 1: NORMAL
BH CV STRESS COMMENTS STAGE 1: NORMAL
BH CV STRESS DOSE REGADENOSON STAGE 1: 0.4
BH CV STRESS DURATION MIN STAGE 1: 0
BH CV STRESS DURATION SEC STAGE 1: 10
BH CV STRESS HR STAGE 1: 70
BH CV STRESS PROTOCOL 1: NORMAL
BH CV STRESS RECOVERY BP: NORMAL MMHG
BH CV STRESS RECOVERY HR: 70 BPM
BH CV STRESS STAGE 1: 1
LV EF NUC BP: 49 %
MAXIMAL PREDICTED HEART RATE: 147 BPM
PERCENT MAX PREDICTED HR: 47.62 %
STRESS BASELINE BP: 123 MMHG
STRESS BASELINE HR: 70 BPM
STRESS PERCENT HR: 56 %
STRESS POST EXERCISE DUR MIN: 0 MIN
STRESS POST EXERCISE DUR SEC: 10 SEC
STRESS POST PEAK BP: NORMAL MMHG
STRESS POST PEAK HR: 70 BPM
STRESS TARGET HR: 125 BPM

## 2019-01-18 ENCOUNTER — OFFICE VISIT (OUTPATIENT)
Dept: CARDIOLOGY | Facility: CLINIC | Age: 74
End: 2019-01-18

## 2019-01-18 VITALS
BODY MASS INDEX: 31.23 KG/M2 | WEIGHT: 199 LBS | SYSTOLIC BLOOD PRESSURE: 120 MMHG | OXYGEN SATURATION: 99 % | HEART RATE: 67 BPM | HEIGHT: 67 IN | DIASTOLIC BLOOD PRESSURE: 62 MMHG

## 2019-01-18 DIAGNOSIS — Z95.810 AICD (AUTOMATIC CARDIOVERTER/DEFIBRILLATOR) PRESENT: Primary | ICD-10-CM

## 2019-01-18 DIAGNOSIS — G89.29 CHRONIC RIGHT-SIDED LOW BACK PAIN WITH RIGHT-SIDED SCIATICA: ICD-10-CM

## 2019-01-18 DIAGNOSIS — I42.8 NON-ISCHEMIC CARDIOMYOPATHY (HCC): ICD-10-CM

## 2019-01-18 DIAGNOSIS — R06.09 DOE (DYSPNEA ON EXERTION): ICD-10-CM

## 2019-01-18 DIAGNOSIS — C43.9 METASTATIC MELANOMA (HCC): ICD-10-CM

## 2019-01-18 DIAGNOSIS — E78.2 MIXED HYPERLIPIDEMIA: ICD-10-CM

## 2019-01-18 DIAGNOSIS — R07.2 PRECORDIAL CHEST PAIN: ICD-10-CM

## 2019-01-18 DIAGNOSIS — M54.41 CHRONIC RIGHT-SIDED LOW BACK PAIN WITH RIGHT-SIDED SCIATICA: ICD-10-CM

## 2019-01-18 PROCEDURE — 99214 OFFICE O/P EST MOD 30 MIN: CPT | Performed by: INTERNAL MEDICINE

## 2019-01-18 PROCEDURE — 93000 ELECTROCARDIOGRAM COMPLETE: CPT | Performed by: INTERNAL MEDICINE

## 2019-01-18 RX ORDER — AMOXICILLIN AND CLAVULANATE POTASSIUM 875; 125 MG/1; MG/1
TABLET, FILM COATED ORAL
Refills: 0 | COMMUNITY
Start: 2019-01-13 | End: 2019-12-31

## 2019-01-18 RX ORDER — HYDROCODONE BITARTRATE AND ACETAMINOPHEN 5; 325 MG/1; MG/1
TABLET ORAL
Refills: 0 | Status: ON HOLD | COMMUNITY
Start: 2019-01-13 | End: 2020-06-20

## 2019-01-18 NOTE — PROGRESS NOTES
Wilmar Reyes Jr.  [id number]  1945  73 y.o.  male    Referring Provider: Alex Haynes MD    Reason for Follow-up Visit:  Routine follow up.  Had ICD placed  Had myocardial perfusion scan      Subjective      Similar symptoms as during last visit    Moderate chronic exertional shortness of breath on exertion relieved with rest  No significant cough or wheezing  Going on for several months  No palpitations    Now has associated chest pain  No significant pedal edema  No fever or chills    No significant expectoration  No hemoptysis  No presyncope or syncope     Chest pain with exertion, moderate substernal, pressure like. Lasts less than 5 minutes  Started 6-8 weeks ago  Occurs once or twice a week  No associated diaphoresis  No associated nausea    No radiation  Precipitated with exertion  Relieved with rest  Not positional  No change with intake of food or antacids  No change with breathing    Joint pain in small, medium and large joints  Chronic low back pain      myocardial perfusion scan      · Left ventricular ejection fraction is borderline normal (Calculated EF = 49%).  · Myocardial perfusion imaging indicates a small-sized area of ischemia located in the apex.  · Diaphragmatic attenuation artifact is present.  · Raw images reviewed with the following abnormalities noted: vertical motion artifact.  · Impressions are consistent with a low risk study.    History of present illness:  Wilmar Reyes Jr. is a 73 y.o. yo male with history of congestive heart failure  who presents today for   Chief Complaint   Patient presents with   • Atrial Fibrillation     6 wk f/u   .    History  Past Medical History:   Diagnosis Date   • AICD (automatic cardioverter/defibrillator) present    • CAD (coronary artery disease)    • CAD in native artery     7/2015- ECHO: EF 50-55% (increased from 2/2013- 25%), abnormal LV diastolic function. 8/2015- LexiScan: negative for ischemia/scar 2/2013- Cath: Mild CAD, non-ischemic  cardiomyopathy.   • Cancer (CMS/HCC)    • Chest pain, unspecified    • Chronic combined systolic and diastolic congestive heart failure (CMS/HCC)      7/2015- ECHO: EF 50-55% (increased from 2/2013- 25%), abnormal LV diastolic function.as    • COPD (chronic obstructive pulmonary disease) (CMS/HCC)    • FH: chemotherapy    • HLD (hyperlipidemia)    • Hx of scabies    • Hyperlipidemia, mixed    • Hypertension    • Hypertension, benign    • Immune deficiency disorder (CMS/HCC)    • Left bundle branch block    • Myocardial infarction (CMS/HCC)    • Non-ischemic cardiomyopathy (CMS/HCC)     LifeVest   • Status post implantation of automatic cardioverter/defibrillator (AICD)      Placed 4/2013. Interrogated by [facility].   ,   Past Surgical History:   Procedure Laterality Date   • A-V CARDIAC PACEMAKER INSERTION     • CARDIAC DEFIBRILLATOR PLACEMENT Left 02/05/2013    Sev dil nonischemic cardiomyopathy. Mild CAD.   • CORONARY ANGIOPLASTY     • LYMPH NODE BIOPSY      left leg lymp node bx 2/2016   • OTHER SURGICAL HISTORY  04/29/2013    BS BiV ICD Implantation   • SKIN CANCER EXCISION     ,   Family History   Problem Relation Age of Onset   • Heart disease Mother    • Heart disease Father    ,   Social History     Tobacco Use   • Smoking status: Former Smoker     Years: 32.00     Types: Cigarettes   • Smokeless tobacco: Never Used   Substance Use Topics   • Alcohol use: No   • Drug use: No   ,     Medications  Current Outpatient Medications   Medication Sig Dispense Refill   • acetaminophen (TYLENOL) 325 MG tablet Take 650 mg by mouth Every 6 (Six) Hours As Needed for Mild Pain (1-3).     • amoxicillin-clavulanate (AUGMENTIN) 875-125 MG per tablet TK 1 T PO  BID  0   • apixaban (ELIQUIS) 5 MG tablet tablet Take 1 tablet by mouth 2 (Two) Times a Day. 60 tablet 11   • aspirin 81 MG tablet Take 81 mg by mouth Daily.     • budesonide-formoterol (SYMBICORT) 160-4.5 MCG/ACT inhaler Inhale 2 puffs 2 (Two) Times a Day.     •  carvedilol (COREG) 25 MG tablet Take 25 mg by mouth 2 (Two) Times a Day With Meals.     • fenofibrate (TRICOR) 145 MG tablet Take 145 mg by mouth Daily.     • folic acid (FOLVITE) 1 MG tablet Take 1 mg by mouth Daily.     • furosemide (LASIX) 20 MG tablet Take 20 mg by mouth 2 (Two) Times a Day.     • HYDROcodone-acetaminophen (NORCO) 5-325 MG per tablet TK 1 T PO Q 8 HOURS PRN P  0   • HYDROcodone-acetaminophen (NORCO) 7.5-325 MG per tablet Take 1 tablet by mouth Every 6 (Six) Hours As Needed for moderate pain (4-6).     • ipratropium-albuterol (COMBIVENT RESPIMAT)  MCG/ACT inhaler Inhale 1 puff 4 (Four) Times a Day As Needed for wheezing.     • lisinopril (PRINIVIL,ZESTRIL) 40 MG tablet Take 40 mg by mouth Daily.     • NAPROXEN PO Take  by mouth As Needed.     • nitroglycerin (NITROSTAT) 0.4 MG SL tablet Place 0.4 mg under the tongue Every 5 (Five) Minutes As Needed for chest pain. Take no more than 3 doses in 15 minutes.     • ondansetron ODT (ZOFRAN-ODT) 4 MG disintegrating tablet Take 1 tablet by mouth 4 (Four) Times a Day As Needed for Nausea or Vomiting. 20 tablet 0   • POTASSIUM CHLORIDE ER PO Take 10 mEq by mouth.     • rosuvastatin (CRESTOR) 40 MG tablet Take 40 mg by mouth Daily.       No current facility-administered medications for this visit.        Allergies:  Patient has no known allergies.    Review of Systems  Review of Systems   Constitution: Positive for weakness and malaise/fatigue. Negative for chills, decreased appetite, fever, weight gain and weight loss.   HENT: Negative.  Negative for nosebleeds.    Eyes: Negative.  Negative for visual disturbance.   Cardiovascular: Positive for chest pain and dyspnea on exertion. Negative for claudication, cyanosis, irregular heartbeat, leg swelling, near-syncope, orthopnea, palpitations, paroxysmal nocturnal dyspnea and syncope.   Respiratory: Positive for shortness of breath. Negative for cough, hemoptysis and snoring.    Endocrine: Negative.   "Negative for cold intolerance and heat intolerance.   Hematologic/Lymphatic: Negative.  Negative for bleeding problem. Does not bruise/bleed easily.   Skin: Negative.  Negative for rash.   Musculoskeletal: Positive for arthritis, back pain and stiffness. Negative for falls.   Gastrointestinal: Negative for abdominal pain, anorexia, constipation, diarrhea, heartburn, melena, nausea and vomiting.   Genitourinary: Negative.  Negative for hematuria.   Neurological: Negative for dizziness, headaches and light-headedness.   Psychiatric/Behavioral: Negative.  Negative for altered mental status.   Allergic/Immunologic: Negative for persistent infections.       Objective     Physical Exam:  /62   Pulse 67   Ht 170.2 cm (67\")   Wt 90.3 kg (199 lb)   SpO2 99%   BMI 31.17 kg/m²   Physical Exam   Constitutional: He appears well-developed.   HENT:   Head: Normocephalic.   Neck: Normal carotid pulses and no JVD present. No tracheal tenderness present. Carotid bruit is not present. No tracheal deviation and no edema present.   Cardiovascular: Regular rhythm and normal pulses.   Murmur heard.   Systolic murmur is present with a grade of 2/6.  Pulmonary/Chest: Effort normal. No stridor.   Abdominal: Soft.   Neurological: He is alert. He has normal strength. No cranial nerve deficit or sensory deficit.   Skin: Skin is warm.   Psychiatric: He has a normal mood and affect. His speech is normal and behavior is normal.       Results Review:    Results for orders placed during the hospital encounter of 05/15/18   Adult Transthoracic Echo Complete W/ Cont if Necessary Per Protocol    Narrative · Left ventricular systolic function is normal. Estimated EF = 60%.  · Left ventricular diastolic dysfunction.  · Mild pulmonary hypertension is present.      myocardial perfusion scan      · Left ventricular ejection fraction is borderline normal (Calculated EF = 49%).  · Myocardial perfusion imaging indicates a small-sized area of " ischemia located in the apex.  · Diaphragmatic attenuation artifact is present.  · Raw images reviewed with the following abnormalities noted: vertical motion artifact.  · Impressions are consistent with a low risk study.       ECG 12 Lead  Date/Time: 1/18/2019 12:13 PM  Performed by: Patrick Elliott MD  Authorized by: Patrick Elliott MD   Comparison: compared with previous ECG from 5/1/2018  Similar to previous ECG  Comparison to previous ECG: AV paced   Rhythm: paced  Rate: normal            Assessment/Plan   Patient Active Problem List   Diagnosis   • Metastatic melanoma (CMS/HCC)   • HLD (hyperlipidemia)   • Non-ischemic cardiomyopathy (CMS/HCC)   • AICD (automatic cardioverter/defibrillator) present   • Chronic right-sided low back pain with right-sided sciatica   • CUEVAS (dyspnea on exertion)   • Hx of scabies   • Precordial chest pain       No palpitations. No significant pedal edema. Compliant with medications and diet. Latest labs and medications reviewed.      Plan        Monitor cardiac rhythm device function regularly per established schedule or PRN ONCE [facility] APPROVES US TO CHECK   HE WANTS THIS  CURRENTLY MONITORED AT [facility]    Conservative medical therapy per patient. Risks, benefits and alternatives explained. The patient understands and wishes to proceed with only conservative therapy.  The patient expressively declined any invasive testing or treatment       S/L NTG PRN for chest pain, call me or go to ER if has to use S/L nitroglycerin     xxxxxxxxxxxxxxxxxxxxxxxxxxxxxxxxxxxxxxxxxxxxxxxxxxxxxxxxxxxxxxxxxxxxxxxxxxxxxxxxxxxxxxxxxxxxxxxxxxxxxxxxxxxxxxxxxxxxxxxxxxxxxxxxxxxxxxxxxxxxxxxxxxxxxxxxxxxxxxxxxxxxxxxxxxxxxxxxxxxxxxxxxxxxxxxxxxxxxxxxxxxxxxxxxxxxxxxxxxxxxxxxxxxxxxxxxxxxxxxxxxxxxxxx  Health maintenance    Similar symptoms as during last  visit        xxxxxxxxxxxxxxxxxxxxxxxxxxxxxxxxxxxxxxxxxxxxxxxxxxxxxxxxxxxxxxxxxxxxxxxxxxxxxxxxxxxxxxxxxxxxxxxxxxxxxxxxxxxxxxxxxxxxxxxxxxxxxxxxxxxxxxxxxxxxxxxxxxxxxxxxxxxxxxxxxxxxxxxxxxxxxxxxxxxxxxxxxxxxxxxxxxxxxxxxxxxxxxxxxxxxxxxxxxxxxxxxxxxxxxxxxxxxxxxxxxxxxxxx  Return in about 6 months (around 7/18/2019).

## 2019-05-23 ENCOUNTER — APPOINTMENT (OUTPATIENT)
Dept: CT IMAGING | Facility: HOSPITAL | Age: 74
End: 2019-05-23

## 2019-05-23 ENCOUNTER — HOSPITAL ENCOUNTER (EMERGENCY)
Facility: HOSPITAL | Age: 74
Discharge: HOME | End: 2019-05-23
Attending: EMERGENCY MEDICINE | Admitting: EMERGENCY MEDICINE

## 2019-05-23 VITALS
HEIGHT: 68 IN | BODY MASS INDEX: 29.86 KG/M2 | OXYGEN SATURATION: 99 % | WEIGHT: 197 LBS | SYSTOLIC BLOOD PRESSURE: 155 MMHG | RESPIRATION RATE: 18 BRPM | HEART RATE: 70 BPM | TEMPERATURE: 98.2 F | DIASTOLIC BLOOD PRESSURE: 70 MMHG

## 2019-05-23 DIAGNOSIS — R09.1 PLEURISY: ICD-10-CM

## 2019-05-23 DIAGNOSIS — R07.89 CHEST WALL PAIN: Primary | ICD-10-CM

## 2019-05-23 DIAGNOSIS — N39.0 ACUTE UTI (URINARY TRACT INFECTION): ICD-10-CM

## 2019-05-23 LAB
ALBUMIN SERPL-MCNC: 4 G/DL (ref 3.5–5)
ALBUMIN/GLOB SERPL: 1.6 G/DL (ref 1.1–2.5)
ALP SERPL-CCNC: 47 U/L (ref 24–120)
ALT SERPL W P-5'-P-CCNC: 19 U/L (ref 0–54)
ANION GAP SERPL CALCULATED.3IONS-SCNC: 8 MMOL/L (ref 4–13)
APTT PPP: 34.3 SECONDS (ref 24.1–35)
ARTERIAL PATENCY WRIST A: POSITIVE
AST SERPL-CCNC: 30 U/L (ref 7–45)
ATMOSPHERIC PRESS: 753 MMHG
BACTERIA UR QL AUTO: ABNORMAL /HPF
BASE EXCESS BLDA CALC-SCNC: -1.5 MMOL/L (ref 0–2)
BASOPHILS # BLD AUTO: 0.04 10*3/MM3 (ref 0–0.2)
BASOPHILS NFR BLD AUTO: 0.9 % (ref 0–2)
BDY SITE: ABNORMAL
BILIRUB SERPL-MCNC: 0.6 MG/DL (ref 0.1–1)
BILIRUB UR QL STRIP: NEGATIVE
BODY TEMPERATURE: 37 C
BUN BLD-MCNC: 17 MG/DL (ref 5–21)
BUN/CREAT SERPL: 13.9 (ref 7–25)
CALCIUM SPEC-SCNC: 9.1 MG/DL (ref 8.4–10.4)
CHLORIDE SERPL-SCNC: 108 MMOL/L (ref 98–110)
CK SERPL-CCNC: 93 U/L (ref 0–203)
CLARITY UR: CLEAR
CO2 SERPL-SCNC: 21 MMOL/L (ref 24–31)
COLOR UR: YELLOW
CREAT BLD-MCNC: 1.22 MG/DL (ref 0.5–1.4)
CRP SERPL-MCNC: <0.5 MG/DL (ref 0–0.99)
D DIMER PPP FEU-MCNC: 0.4 MG/L (FEU) (ref 0–0.5)
DEPRECATED RDW RBC AUTO: 42.9 FL (ref 40–54)
EOSINOPHIL # BLD AUTO: 0.12 10*3/MM3 (ref 0–0.7)
EOSINOPHIL NFR BLD AUTO: 2.8 % (ref 0–4)
ERYTHROCYTE [DISTWIDTH] IN BLOOD BY AUTOMATED COUNT: 13.7 % (ref 12–15)
GFR SERPL CREATININE-BSD FRML MDRD: 58 ML/MIN/1.73
GLOBULIN UR ELPH-MCNC: 2.5 GM/DL
GLUCOSE BLD-MCNC: 107 MG/DL (ref 70–100)
GLUCOSE UR STRIP-MCNC: NEGATIVE MG/DL
HCO3 BLDA-SCNC: 23.2 MMOL/L (ref 20–26)
HCT VFR BLD AUTO: 36.8 % (ref 40–52)
HGB BLD-MCNC: 12.9 G/DL (ref 14–18)
HGB UR QL STRIP.AUTO: NEGATIVE
HOLD SPECIMEN: NORMAL
HOLD SPECIMEN: NORMAL
HYALINE CASTS UR QL AUTO: ABNORMAL /LPF
IMM GRANULOCYTES # BLD AUTO: 0.01 10*3/MM3 (ref 0–0.05)
IMM GRANULOCYTES NFR BLD AUTO: 0.2 % (ref 0–5)
INR PPP: 1.03 (ref 0.91–1.09)
KETONES UR QL STRIP: NEGATIVE
LEUKOCYTE ESTERASE UR QL STRIP.AUTO: ABNORMAL
LYMPHOCYTES # BLD AUTO: 1.01 10*3/MM3 (ref 0.72–4.86)
LYMPHOCYTES NFR BLD AUTO: 23.6 % (ref 15–45)
Lab: ABNORMAL
MCH RBC QN AUTO: 29.9 PG (ref 28–32)
MCHC RBC AUTO-ENTMCNC: 35.1 G/DL (ref 33–36)
MCV RBC AUTO: 85.4 FL (ref 82–95)
MODALITY: ABNORMAL
MONOCYTES # BLD AUTO: 0.41 10*3/MM3 (ref 0.19–1.3)
MONOCYTES NFR BLD AUTO: 9.6 % (ref 4–12)
MYOGLOBIN SERPL-MCNC: 49.6 NG/ML (ref 0–110)
NEUTROPHILS # BLD AUTO: 2.69 10*3/MM3 (ref 1.87–8.4)
NEUTROPHILS NFR BLD AUTO: 62.9 % (ref 39–78)
NITRITE UR QL STRIP: POSITIVE
NRBC BLD AUTO-RTO: 0 /100 WBC (ref 0–0.2)
NT-PROBNP SERPL-MCNC: 913 PG/ML (ref 0–900)
PCO2 BLDA: 38 MM HG (ref 35–45)
PH BLDA: 7.39 PH UNITS (ref 7.35–7.45)
PH UR STRIP.AUTO: 8 [PH] (ref 5–8)
PLATELET # BLD AUTO: 181 10*3/MM3 (ref 130–400)
PMV BLD AUTO: 9.8 FL (ref 6–12)
PO2 BLDA: 76 MM HG (ref 83–108)
POTASSIUM BLD-SCNC: 4.5 MMOL/L (ref 3.5–5.3)
PROT SERPL-MCNC: 6.5 G/DL (ref 6.3–8.7)
PROT UR QL STRIP: NEGATIVE
PROTHROMBIN TIME: 13.8 SECONDS (ref 11.9–14.6)
RBC # BLD AUTO: 4.31 10*6/MM3 (ref 4.8–5.9)
RBC # UR: ABNORMAL /HPF
REF LAB TEST METHOD: ABNORMAL
SAO2 % BLDCOA: 96.5 % (ref 94–99)
SODIUM BLD-SCNC: 137 MMOL/L (ref 135–145)
SP GR UR STRIP: 1.02 (ref 1–1.03)
SQUAMOUS #/AREA URNS HPF: ABNORMAL /HPF
TROPONIN I SERPL-MCNC: 0.01 NG/ML (ref 0–0.03)
UROBILINOGEN UR QL STRIP: ABNORMAL
VENTILATOR MODE: ABNORMAL
WBC NRBC COR # BLD: 4.28 10*3/MM3 (ref 4.8–10.8)
WBC UR QL AUTO: ABNORMAL /HPF
WHOLE BLOOD HOLD SPECIMEN: NORMAL
WHOLE BLOOD HOLD SPECIMEN: NORMAL

## 2019-05-23 PROCEDURE — 93010 ELECTROCARDIOGRAM REPORT: CPT | Performed by: INTERNAL MEDICINE

## 2019-05-23 PROCEDURE — 80053 COMPREHEN METABOLIC PANEL: CPT | Performed by: EMERGENCY MEDICINE

## 2019-05-23 PROCEDURE — 93005 ELECTROCARDIOGRAM TRACING: CPT | Performed by: EMERGENCY MEDICINE

## 2019-05-23 PROCEDURE — 85379 FIBRIN DEGRADATION QUANT: CPT | Performed by: EMERGENCY MEDICINE

## 2019-05-23 PROCEDURE — 93005 ELECTROCARDIOGRAM TRACING: CPT

## 2019-05-23 PROCEDURE — 82803 BLOOD GASES ANY COMBINATION: CPT

## 2019-05-23 PROCEDURE — 83874 ASSAY OF MYOGLOBIN: CPT | Performed by: EMERGENCY MEDICINE

## 2019-05-23 PROCEDURE — 85610 PROTHROMBIN TIME: CPT | Performed by: EMERGENCY MEDICINE

## 2019-05-23 PROCEDURE — 81001 URINALYSIS AUTO W/SCOPE: CPT | Performed by: EMERGENCY MEDICINE

## 2019-05-23 PROCEDURE — 99283 EMERGENCY DEPT VISIT LOW MDM: CPT

## 2019-05-23 PROCEDURE — 86140 C-REACTIVE PROTEIN: CPT | Performed by: EMERGENCY MEDICINE

## 2019-05-23 PROCEDURE — 71275 CT ANGIOGRAPHY CHEST: CPT

## 2019-05-23 PROCEDURE — 83880 ASSAY OF NATRIURETIC PEPTIDE: CPT | Performed by: EMERGENCY MEDICINE

## 2019-05-23 PROCEDURE — 82550 ASSAY OF CK (CPK): CPT | Performed by: EMERGENCY MEDICINE

## 2019-05-23 PROCEDURE — 84484 ASSAY OF TROPONIN QUANT: CPT | Performed by: EMERGENCY MEDICINE

## 2019-05-23 PROCEDURE — 85025 COMPLETE CBC W/AUTO DIFF WBC: CPT | Performed by: EMERGENCY MEDICINE

## 2019-05-23 PROCEDURE — 85730 THROMBOPLASTIN TIME PARTIAL: CPT | Performed by: EMERGENCY MEDICINE

## 2019-05-23 PROCEDURE — 36600 WITHDRAWAL OF ARTERIAL BLOOD: CPT

## 2019-05-23 PROCEDURE — 0 IOPAMIDOL PER 1 ML: Performed by: EMERGENCY MEDICINE

## 2019-05-23 RX ORDER — CIPROFLOXACIN 500 MG/1
500 TABLET, FILM COATED ORAL 2 TIMES DAILY
Qty: 14 TABLET | Refills: 0 | Status: SHIPPED | OUTPATIENT
Start: 2019-05-23 | End: 2019-05-30

## 2019-05-23 RX ORDER — METHYLPREDNISOLONE 4 MG/1
TABLET ORAL
Qty: 21 TABLET | Refills: 0 | Status: SHIPPED | OUTPATIENT
Start: 2019-05-23 | End: 2019-12-31

## 2019-05-23 RX ADMIN — IOPAMIDOL 125 ML: 755 INJECTION, SOLUTION INTRAVENOUS at 15:05

## 2019-07-19 ENCOUNTER — OFFICE VISIT (OUTPATIENT)
Dept: CARDIOLOGY | Facility: CLINIC | Age: 74
End: 2019-07-19

## 2019-07-19 VITALS
OXYGEN SATURATION: 98 % | BODY MASS INDEX: 29.1 KG/M2 | DIASTOLIC BLOOD PRESSURE: 52 MMHG | HEIGHT: 68 IN | HEART RATE: 74 BPM | WEIGHT: 192 LBS | SYSTOLIC BLOOD PRESSURE: 98 MMHG

## 2019-07-19 DIAGNOSIS — Z86.19 HX OF SCABIES: ICD-10-CM

## 2019-07-19 DIAGNOSIS — E78.2 MIXED HYPERLIPIDEMIA: ICD-10-CM

## 2019-07-19 DIAGNOSIS — I42.8 NON-ISCHEMIC CARDIOMYOPATHY (HCC): ICD-10-CM

## 2019-07-19 DIAGNOSIS — R06.09 DOE (DYSPNEA ON EXERTION): ICD-10-CM

## 2019-07-19 DIAGNOSIS — M54.41 CHRONIC RIGHT-SIDED LOW BACK PAIN WITH RIGHT-SIDED SCIATICA: ICD-10-CM

## 2019-07-19 DIAGNOSIS — C43.9 METASTATIC MELANOMA (HCC): Primary | ICD-10-CM

## 2019-07-19 DIAGNOSIS — Z95.810 AICD (AUTOMATIC CARDIOVERTER/DEFIBRILLATOR) PRESENT: ICD-10-CM

## 2019-07-19 DIAGNOSIS — G89.29 CHRONIC RIGHT-SIDED LOW BACK PAIN WITH RIGHT-SIDED SCIATICA: ICD-10-CM

## 2019-07-19 PROBLEM — R07.2 PRECORDIAL CHEST PAIN: Status: RESOLVED | Noted: 2018-11-21 | Resolved: 2019-07-19

## 2019-07-19 PROCEDURE — 93000 ELECTROCARDIOGRAM COMPLETE: CPT | Performed by: INTERNAL MEDICINE

## 2019-07-19 PROCEDURE — 99214 OFFICE O/P EST MOD 30 MIN: CPT | Performed by: INTERNAL MEDICINE

## 2019-07-19 RX ORDER — OXYCODONE HYDROCHLORIDE AND ACETAMINOPHEN 5; 325 MG/1; MG/1
1 TABLET ORAL 2 TIMES DAILY PRN
COMMUNITY
End: 2020-06-20 | Stop reason: HOSPADM

## 2019-07-19 NOTE — PROGRESS NOTES
Wilmar Reyes Jr.  [id number]  1945  73 y.o.  male    Referring Provider: Alex Haynes MD    Reason for Follow-up Visit:  Routine follow up.  Had ICD placed  Had myocardial perfusion scan      Subjective    No new cardiac events or complaints since last visit   Had shingles left chest wall    Almost healed now    Similar symptoms as during last visit    Mild chronic exertional shortness of breath on exertion relieved with rest  No significant cough or wheezing      No palpitations  No associated chest pain  No significant pedal edema    No fever or chills  No significant expectoration    No hemoptysis  No presyncope or syncope       [facility] monitoring device  History of present illness:  Wilmar Reyes Jr. is a 73 y.o. yo male with history of congestive heart failure  who presents today for   Chief Complaint   Patient presents with   • Atrial Fibrillation     6 mo f/u - pt currently has Shingles   .    History  Past Medical History:   Diagnosis Date   • AICD (automatic cardioverter/defibrillator) present    • CAD (coronary artery disease)    • CAD in native artery     7/2015- ECHO: EF 50-55% (increased from 2/2013- 25%), abnormal LV diastolic function. 8/2015- LexiScan: negative for ischemia/scar 2/2013- Cath: Mild CAD, non-ischemic cardiomyopathy.   • Cancer (CMS/HCC)    • Chest pain, unspecified    • Chronic combined systolic and diastolic congestive heart failure (CMS/HCC)      7/2015- ECHO: EF 50-55% (increased from 2/2013- 25%), abnormal LV diastolic function.as    • COPD (chronic obstructive pulmonary disease) (CMS/HCC)    • FH: chemotherapy    • HLD (hyperlipidemia)    • Hx of scabies    • Hyperlipidemia, mixed    • Hypertension    • Hypertension, benign    • Immune deficiency disorder (CMS/HCC)    • Left bundle branch block    • Myocardial infarction (CMS/HCC)    • Non-ischemic cardiomyopathy (CMS/HCC)     LifeVest   • Status post implantation of automatic cardioverter/defibrillator (AICD)      Placed  4/2013. Interrogated by [facility].   ,   Past Surgical History:   Procedure Laterality Date   • A-V CARDIAC PACEMAKER INSERTION     • CARDIAC DEFIBRILLATOR PLACEMENT Left 02/05/2013    Sev dil nonischemic cardiomyopathy. Mild CAD.   • CORONARY ANGIOPLASTY     • LYMPH NODE BIOPSY      left leg lymp node bx 2/2016   • OTHER SURGICAL HISTORY  04/29/2013    BS BiV ICD Implantation   • SKIN CANCER EXCISION     ,   Family History   Problem Relation Age of Onset   • Heart disease Mother    • Heart disease Father    ,   Social History     Tobacco Use   • Smoking status: Former Smoker     Years: 32.00     Types: Cigarettes   • Smokeless tobacco: Never Used   Substance Use Topics   • Alcohol use: No   • Drug use: No   ,     Medications  Current Outpatient Medications   Medication Sig Dispense Refill   • acetaminophen (TYLENOL) 325 MG tablet Take 650 mg by mouth Every 6 (Six) Hours As Needed for Mild Pain (1-3).     • apixaban (ELIQUIS) 5 MG tablet tablet Take 1 tablet by mouth 2 (Two) Times a Day. 60 tablet 11   • aspirin 81 MG tablet Take 81 mg by mouth Daily.     • budesonide-formoterol (SYMBICORT) 160-4.5 MCG/ACT inhaler Inhale 2 puffs 2 (Two) Times a Day.     • carvedilol (COREG) 25 MG tablet Take 25 mg by mouth 2 (Two) Times a Day With Meals.     • fenofibrate (TRICOR) 145 MG tablet Take 145 mg by mouth Daily.     • folic acid (FOLVITE) 1 MG tablet Take 1 mg by mouth Daily.     • furosemide (LASIX) 20 MG tablet Take 20 mg by mouth 2 (Two) Times a Day.     • HYDROcodone-acetaminophen (NORCO) 5-325 MG per tablet TK 1 T PO Q 8 HOURS PRN P  0   • lisinopril (PRINIVIL,ZESTRIL) 40 MG tablet Take 40 mg by mouth Daily.     • NAPROXEN PO Take  by mouth As Needed.     • nitroglycerin (NITROSTAT) 0.4 MG SL tablet Place 0.4 mg under the tongue Every 5 (Five) Minutes As Needed for chest pain. Take no more than 3 doses in 15 minutes.     • ondansetron ODT (ZOFRAN-ODT) 4 MG disintegrating tablet Take 1 tablet by mouth 4 (Four)  Times a Day As Needed for Nausea or Vomiting. 20 tablet 0   • oxyCODONE-acetaminophen (PERCOCET) 5-325 MG per tablet Take 1 tablet by mouth Every 6 (Six) Hours As Needed.     • POTASSIUM CHLORIDE ER PO Take 10 mEq by mouth.     • rosuvastatin (CRESTOR) 40 MG tablet Take 40 mg by mouth Daily.     • amoxicillin-clavulanate (AUGMENTIN) 875-125 MG per tablet TK 1 T PO  BID  0   • HYDROcodone-acetaminophen (NORCO) 7.5-325 MG per tablet Take 1 tablet by mouth Every 6 (Six) Hours As Needed for moderate pain (4-6).     • ipratropium-albuterol (COMBIVENT RESPIMAT)  MCG/ACT inhaler Inhale 1 puff 4 (Four) Times a Day As Needed for wheezing.     • methylPREDNISolone (MEDROL, MICHAEL,) 4 MG tablet Take as directed on package instructions. 21 tablet 0     No current facility-administered medications for this visit.        Allergies:  Patient has no known allergies.    Review of Systems  Review of Systems   Constitution: Positive for weakness and malaise/fatigue. Negative for chills, decreased appetite, fever, weight gain and weight loss.   HENT: Negative.  Negative for nosebleeds.    Eyes: Negative.  Negative for visual disturbance.   Cardiovascular: Positive for dyspnea on exertion. Negative for chest pain, claudication, cyanosis, irregular heartbeat, leg swelling, near-syncope, orthopnea, palpitations, paroxysmal nocturnal dyspnea and syncope.   Respiratory: Positive for shortness of breath. Negative for cough, hemoptysis and snoring.    Endocrine: Negative.  Negative for cold intolerance and heat intolerance.   Hematologic/Lymphatic: Negative.  Negative for bleeding problem. Does not bruise/bleed easily.   Skin: Negative.  Negative for rash.   Musculoskeletal: Positive for arthritis, back pain and stiffness. Negative for falls.   Gastrointestinal: Negative for abdominal pain, anorexia, constipation, diarrhea, heartburn, melena, nausea and vomiting.   Genitourinary: Negative.  Negative for hematuria.   Neurological: Negative  "for dizziness, headaches and light-headedness.   Psychiatric/Behavioral: Negative.  Negative for altered mental status.   Allergic/Immunologic: Negative for persistent infections.       Objective     Physical Exam:  BP 98/52   Pulse 74   Ht 172.7 cm (68\")   Wt 87.1 kg (192 lb)   SpO2 98%   BMI 29.19 kg/m²   Physical Exam   Constitutional: He appears well-developed.   HENT:   Head: Normocephalic.   Neck: Normal carotid pulses and no JVD present. No tracheal tenderness present. Carotid bruit is not present. No tracheal deviation and no edema present.   Cardiovascular: Regular rhythm and normal pulses.   Murmur heard.   Systolic murmur is present with a grade of 2/6.  Pulmonary/Chest: Effort normal. No stridor.   Abdominal: Soft.   Neurological: He is alert. He has normal strength. No cranial nerve deficit or sensory deficit.   Skin: Skin is warm.   Psychiatric: He has a normal mood and affect. His speech is normal and behavior is normal.       Results Review:    Results for orders placed during the hospital encounter of 05/15/18   Adult Transthoracic Echo Complete W/ Cont if Necessary Per Protocol    Narrative · Left ventricular systolic function is normal. Estimated EF = 60%.  · Left ventricular diastolic dysfunction.  · Mild pulmonary hypertension is present.      myocardial perfusion scan      · Left ventricular ejection fraction is borderline normal (Calculated EF = 49%).  · Myocardial perfusion imaging indicates a small-sized area of ischemia located in the apex.  · Diaphragmatic attenuation artifact is present.  · Raw images reviewed with the following abnormalities noted: vertical motion artifact.  · Impressions are consistent with a low risk study.       ECG 12 Lead  Date/Time: 7/19/2019 12:25 PM  Performed by: Patrick Elliott MD  Authorized by: Patrick Elliott MD   Comparison: compared with previous ECG from 5/23/2019  Comparison to previous ECG: premature ventricular contractions not seen  Rhythm: " paced  Rate: normal            Assessment/Plan   Patient Active Problem List   Diagnosis   • Metastatic melanoma (CMS/HCC)   • HLD (hyperlipidemia)   • Non-ischemic cardiomyopathy (CMS/HCC)   • AICD (automatic cardioverter/defibrillator) present   • Chronic right-sided low back pain with right-sided sciatica   • CUEVAS (dyspnea on exertion)   • Hx of scabies       No palpitations. No significant pedal edema. Compliant with medications and diet. Latest labs and medications reviewed.      Plan        Monitor cardiac rhythm device function regularly per established schedule or PRN ONCE [facility] APPROVES US TO CHECK   HE WANTS THIS  CURRENTLY MONITORED AT [facility]    Conservative medical therapy per patient. Risks, benefits and alternatives explained. The patient understands and wishes to proceed with only conservative therapy.  The patient expressively declined any invasive testing or treatment       S/L NTG PRN for chest pain, call me or go to ER if has to use S/L nitroglycerin         ____________________________________________________________________________________________________________________________________________  Health maintenance and recommendations    Similar recommendations as last visit       Offered to give patient  a copy      Questions were encouraged, asked and answered to the patient's  understanding and satisfaction. Questions if any regarding current medications and side effects, need for refills and importance of compliance to medications stressed.    Reviewed available prior notes, consults, prior visits, laboratory findings, radiology and cardiology relevant reports. Updated chart as applicable. I have reviewed the patient's medical history in detail and updated the computerized patient record as relevant.      Updated patient regarding any new or relevant abnormalities on review of records or any new findings on physical exam. Mentioned to patient about purpose of visit and desirable health short and  long term goals and objectives.    Primary to monitor CBC CMP Lipid panel and TSH as applicable    ___________________________________________________________________________________________________________________________________________        Per patient request   Return in about 1 year (around 7/19/2020).

## 2020-02-18 ENCOUNTER — OFFICE VISIT (OUTPATIENT)
Dept: CARDIOLOGY | Facility: CLINIC | Age: 75
End: 2020-02-18

## 2020-02-18 VITALS
HEIGHT: 68 IN | WEIGHT: 195 LBS | OXYGEN SATURATION: 98 % | BODY MASS INDEX: 29.55 KG/M2 | HEART RATE: 70 BPM | SYSTOLIC BLOOD PRESSURE: 100 MMHG | DIASTOLIC BLOOD PRESSURE: 56 MMHG

## 2020-02-18 DIAGNOSIS — M54.41 CHRONIC RIGHT-SIDED LOW BACK PAIN WITH RIGHT-SIDED SCIATICA: ICD-10-CM

## 2020-02-18 DIAGNOSIS — Z95.810 AICD (AUTOMATIC CARDIOVERTER/DEFIBRILLATOR) PRESENT: ICD-10-CM

## 2020-02-18 DIAGNOSIS — C43.9 METASTATIC MELANOMA (HCC): ICD-10-CM

## 2020-02-18 DIAGNOSIS — E78.2 MIXED HYPERLIPIDEMIA: ICD-10-CM

## 2020-02-18 DIAGNOSIS — I42.8 NON-ISCHEMIC CARDIOMYOPATHY (HCC): ICD-10-CM

## 2020-02-18 DIAGNOSIS — R06.09 DOE (DYSPNEA ON EXERTION): Primary | ICD-10-CM

## 2020-02-18 DIAGNOSIS — G89.29 CHRONIC RIGHT-SIDED LOW BACK PAIN WITH RIGHT-SIDED SCIATICA: ICD-10-CM

## 2020-02-18 PROCEDURE — 99214 OFFICE O/P EST MOD 30 MIN: CPT | Performed by: INTERNAL MEDICINE

## 2020-02-18 PROCEDURE — 93000 ELECTROCARDIOGRAM COMPLETE: CPT | Performed by: INTERNAL MEDICINE

## 2020-02-18 NOTE — PROGRESS NOTES
Wilmar Reyes Jr.  [id number]  1945  74 y.o.  male    Referring Provider: Alex Haynes MD    Reason for Follow-up Visit:  Routine follow up.  Had ICD placed  [facility] monitoring the ICD and will change generator when required  Had myocardial perfusion scan      Subjective      Overall feels well    No new events or complaints since last visit   Overall the patient feels no major change from baseline symptoms   Similar symptoms as during last visit      Worried about recurrence of melanoma awaiting follow up [facility] next week     No new cardiac events or complaints since last visit   Had shingles left chest wall now recovered     Almost healed now    Similar symptoms as during last visit    Mild chronic exertional shortness of breath on exertion relieved with rest  No significant cough or wheezing      No palpitations  No associated chest pain  No significant pedal edema    No fever or chills  No significant expectoration    No hemoptysis  No presyncope or syncope       [facility] monitoring device  History of present illness:  Wilmar Reyes Jr. is a 74 y.o. yo male with history of congestive heart failure  who presents today for   Chief Complaint   Patient presents with   • Chest Pain     6 mo f/u   .    History  Past Medical History:   Diagnosis Date   • AICD (automatic cardioverter/defibrillator) present    • CAD (coronary artery disease)    • CAD in native artery     7/2015- ECHO: EF 50-55% (increased from 2/2013- 25%), abnormal LV diastolic function. 8/2015- LexiScan: negative for ischemia/scar 2/2013- Cath: Mild CAD, non-ischemic cardiomyopathy.   • Cancer (CMS/HCC)     melamona   • Chest pain, unspecified    • Chronic combined systolic and diastolic congestive heart failure (CMS/HCC)      7/2015- ECHO: EF 50-55% (increased from 2/2013- 25%), abnormal LV diastolic function.as    • COPD (chronic obstructive pulmonary disease) (CMS/HCC)    • FH: chemotherapy    • HLD (hyperlipidemia)    • Hx of scabies    •  Hyperlipidemia, mixed    • Hypertension    • Hypertension, benign    • Immune deficiency disorder (CMS/HCC)    • Left bundle branch block    • Myocardial infarction (CMS/HCC)    • Non-ischemic cardiomyopathy (CMS/HCC)     LifeVest   • Status post implantation of automatic cardioverter/defibrillator (AICD)      Placed 4/2013. Interrogated by [facility].   ,   Past Surgical History:   Procedure Laterality Date   • A-V CARDIAC PACEMAKER INSERTION     • CARDIAC DEFIBRILLATOR PLACEMENT Left 02/05/2013    Sev dil nonischemic cardiomyopathy. Mild CAD.   • CORONARY ANGIOPLASTY     • LYMPH NODE BIOPSY      left leg lymp node bx 2/2016   • OTHER SURGICAL HISTORY  04/29/2013    BS BiV ICD Implantation   • SKIN CANCER EXCISION     ,   Family History   Problem Relation Age of Onset   • Heart disease Mother    • Heart disease Father    ,   Social History     Tobacco Use   • Smoking status: Former Smoker     Years: 32.00     Types: Cigarettes   • Smokeless tobacco: Never Used   Substance Use Topics   • Alcohol use: No   • Drug use: No   ,     Medications  Current Outpatient Medications   Medication Sig Dispense Refill   • acetaminophen (TYLENOL) 325 MG tablet Take 650 mg by mouth Every 6 (Six) Hours As Needed for Mild Pain (1-3).     • apixaban (ELIQUIS) 5 MG tablet tablet Take 1 tablet by mouth 2 (Two) Times a Day. 60 tablet 11   • aspirin 81 MG tablet Take 81 mg by mouth Daily.     • budesonide-formoterol (SYMBICORT) 160-4.5 MCG/ACT inhaler Inhale 2 puffs 2 (Two) Times a Day.     • carvedilol (COREG) 25 MG tablet Take 25 mg by mouth 2 (Two) Times a Day With Meals.     • fenofibrate (TRICOR) 145 MG tablet Take 145 mg by mouth Daily.     • folic acid (FOLVITE) 1 MG tablet Take 1 mg by mouth Daily.     • furosemide (LASIX) 20 MG tablet Take 20 mg by mouth 2 (Two) Times a Day.     • HYDROcodone-acetaminophen (NORCO) 5-325 MG per tablet TK 1 T PO Q 8 HOURS PRN P  0   • HYDROcodone-acetaminophen (NORCO) 7.5-325 MG per tablet Take 1  tablet by mouth Every 6 (Six) Hours As Needed for moderate pain (4-6).     • ipratropium-albuterol (COMBIVENT RESPIMAT)  MCG/ACT inhaler Inhale 1 puff 4 (Four) Times a Day As Needed for wheezing.     • lisinopril (PRINIVIL,ZESTRIL) 40 MG tablet Take 40 mg by mouth Daily.     • nitroglycerin (NITROSTAT) 0.4 MG SL tablet Place 0.4 mg under the tongue Every 5 (Five) Minutes As Needed for chest pain. Take no more than 3 doses in 15 minutes.     • ondansetron ODT (ZOFRAN-ODT) 4 MG disintegrating tablet Take 1 tablet by mouth 4 (Four) Times a Day As Needed for Nausea or Vomiting. 20 tablet 0   • oxyCODONE-acetaminophen (PERCOCET) 5-325 MG per tablet Take 1 tablet by mouth Every 6 (Six) Hours As Needed.     • POTASSIUM CHLORIDE ER PO Take 10 mEq by mouth.     • rosuvastatin (CRESTOR) 40 MG tablet Take 40 mg by mouth Daily.       No current facility-administered medications for this visit.        Allergies:  Patient has no known allergies.    Review of Systems  Review of Systems   Constitution: Positive for malaise/fatigue. Negative for chills, decreased appetite, fever, weight gain and weight loss.   HENT: Negative.  Negative for nosebleeds.    Eyes: Negative.  Negative for visual disturbance.   Cardiovascular: Positive for dyspnea on exertion. Negative for chest pain, claudication, cyanosis, irregular heartbeat, leg swelling, near-syncope, orthopnea, palpitations, paroxysmal nocturnal dyspnea and syncope.   Respiratory: Positive for shortness of breath. Negative for cough, hemoptysis and snoring.    Endocrine: Negative.  Negative for cold intolerance and heat intolerance.   Hematologic/Lymphatic: Negative.  Negative for bleeding problem. Does not bruise/bleed easily.   Skin: Negative.  Negative for rash.   Musculoskeletal: Positive for arthritis, back pain and stiffness. Negative for falls.   Gastrointestinal: Negative for abdominal pain, anorexia, constipation, diarrhea, heartburn, melena, nausea and vomiting.  "  Genitourinary: Negative.  Negative for hematuria.   Neurological: Positive for weakness. Negative for dizziness, headaches and light-headedness.   Psychiatric/Behavioral: Negative.  Negative for altered mental status.   Allergic/Immunologic: Negative for persistent infections.       Objective     Physical Exam:  /56   Pulse 70   Ht 172.7 cm (68\")   Wt 88.5 kg (195 lb)   SpO2 98%   BMI 29.65 kg/m²   Physical Exam   Constitutional: He appears well-developed.   HENT:   Head: Normocephalic.   Neck: Normal carotid pulses and no JVD present. No tracheal tenderness present. Carotid bruit is not present. No tracheal deviation and no edema present.   Cardiovascular: Regular rhythm and normal pulses.   Murmur heard.   Systolic murmur is present with a grade of 2/6.  Pulmonary/Chest: Effort normal. No stridor.   Abdominal: Soft. He exhibits no distension. There is no hepatosplenomegaly. There is no tenderness.   Neurological: He is alert. He has normal strength. No cranial nerve deficit or sensory deficit.   Skin: Skin is warm.   Psychiatric: He has a normal mood and affect. His speech is normal and behavior is normal.       Results Review:    Results for orders placed during the hospital encounter of 05/15/18   Adult Transthoracic Echo Complete W/ Cont if Necessary Per Protocol    Narrative · Left ventricular systolic function is normal. Estimated EF = 60%.  · Left ventricular diastolic dysfunction.  · Mild pulmonary hypertension is present.      myocardial perfusion scan      · Left ventricular ejection fraction is borderline normal (Calculated EF = 49%).  · Myocardial perfusion imaging indicates a small-sized area of ischemia located in the apex.  · Diaphragmatic attenuation artifact is present.  · Raw images reviewed with the following abnormalities noted: vertical motion artifact.  · Impressions are consistent with a low risk study.       ECG 12 Lead  Date/Time: 2/18/2020 12:24 PM  Performed by: Patrick Elliott, " MD  Authorized by: Patrick Elliott MD   Comparison: compared with previous ECG from 7/19/2019  Similar to previous ECG  Comparison to previous ECG: AV paced   Rhythm: paced  Rate: normal  QRS axis: right    Clinical impression: abnormal EKG            Assessment/Plan   Patient Active Problem List   Diagnosis   • Metastatic melanoma (CMS/HCC)   • HLD (hyperlipidemia)   • Non-ischemic cardiomyopathy (CMS/HCC)   • AICD (automatic cardioverter/defibrillator) present   • Chronic right-sided low back pain with right-sided sciatica   • CUEVAS (dyspnea on exertion)   • Hx of scabies     .      Plan        Orders Placed This Encounter   Procedures   • ECG 12 Lead     This order was created via procedure documentation   • Adult Transthoracic Echo Complete W/ Cont if Necessary Per Protocol     Standing Status:   Future     Standing Expiration Date:   2/17/2021     Order Specific Question:   Reason for exam?     Answer:   Dyspnea      Keep LDL below 70 mg/dl. Monitor liver and renal functions.   Monitor CBC, CMP, TSH (as indicated) and Lipid Panel by primary     S/L NTG PRN for chest pain, call me or go to ER if has to use S/L nitroglycerin         ____________________________________________________________________________________________________________________________________________  Health maintenance and recommendations    Similar recommendations as last visit       Offered to give patient  a copy      Questions were encouraged, asked and answered to the patient's  understanding and satisfaction. Questions if any regarding current medications and side effects, need for refills and importance of compliance to medications stressed.    Reviewed available prior notes, consults, prior visits, laboratory findings, radiology and cardiology relevant reports. Updated chart as applicable. I have reviewed the patient's medical history in detail and updated the computerized patient record as relevant.      Updated patient regarding any  new or relevant abnormalities on review of records or any new findings on physical exam. Mentioned to patient about purpose of visit and desirable health short and long term goals and objectives.    Primary to monitor CBC CMP Lipid panel and TSH as applicable    ___________________________________________________________________________________________________________________________________________       Return in about 4 months (around 6/18/2020).

## 2020-02-27 ENCOUNTER — HOSPITAL ENCOUNTER (OUTPATIENT)
Dept: CARDIOLOGY | Facility: HOSPITAL | Age: 75
Discharge: HOME | End: 2020-02-27
Admitting: INTERNAL MEDICINE

## 2020-02-27 VITALS
WEIGHT: 195 LBS | BODY MASS INDEX: 29.55 KG/M2 | DIASTOLIC BLOOD PRESSURE: 56 MMHG | HEIGHT: 68 IN | SYSTOLIC BLOOD PRESSURE: 100 MMHG

## 2020-02-27 DIAGNOSIS — R06.09 DOE (DYSPNEA ON EXERTION): ICD-10-CM

## 2020-02-27 PROCEDURE — 93306 TTE W/DOPPLER COMPLETE: CPT

## 2020-02-27 PROCEDURE — 93306 TTE W/DOPPLER COMPLETE: CPT | Performed by: INTERNAL MEDICINE

## 2020-02-27 PROCEDURE — 25010000002 PERFLUTREN 6.52 MG/ML SUSPENSION: Performed by: INTERNAL MEDICINE

## 2020-02-27 RX ADMIN — PERFLUTREN: 6.52 INJECTION, SUSPENSION INTRAVENOUS at 14:42

## 2020-02-28 LAB
BH CV ECHO MEAS - AO MAX PG (FULL): 4.2 MMHG
BH CV ECHO MEAS - AO MAX PG: 9.5 MMHG
BH CV ECHO MEAS - AO MEAN PG (FULL): 1.5 MMHG
BH CV ECHO MEAS - AO MEAN PG: 4.5 MMHG
BH CV ECHO MEAS - AO ROOT AREA (BSA CORRECTED): 1.4
BH CV ECHO MEAS - AO ROOT AREA: 6.6 CM^2
BH CV ECHO MEAS - AO ROOT DIAM: 2.9 CM
BH CV ECHO MEAS - AO V2 MAX: 154 CM/SEC
BH CV ECHO MEAS - AO V2 MEAN: 95.6 CM/SEC
BH CV ECHO MEAS - AO V2 VTI: 28.3 CM
BH CV ECHO MEAS - AVA(I,A): 2.8 CM^2
BH CV ECHO MEAS - AVA(I,D): 2.8 CM^2
BH CV ECHO MEAS - AVA(V,A): 2.3 CM^2
BH CV ECHO MEAS - AVA(V,D): 2.3 CM^2
BH CV ECHO MEAS - BSA(HAYCOCK): 2.1 M^2
BH CV ECHO MEAS - BSA: 2 M^2
BH CV ECHO MEAS - BZI_BMI: 29.6 KILOGRAMS/M^2
BH CV ECHO MEAS - BZI_METRIC_HEIGHT: 172.7 CM
BH CV ECHO MEAS - BZI_METRIC_WEIGHT: 88.5 KG
BH CV ECHO MEAS - EDV(CUBED): 150.6 ML
BH CV ECHO MEAS - EDV(MOD-SP4): 195 ML
BH CV ECHO MEAS - EDV(TEICH): 136.5 ML
BH CV ECHO MEAS - EF(CUBED): 64.1 %
BH CV ECHO MEAS - EF(MOD-SP4): 53.6 %
BH CV ECHO MEAS - EF(TEICH): 55.2 %
BH CV ECHO MEAS - ESV(CUBED): 54 ML
BH CV ECHO MEAS - ESV(MOD-SP4): 90.5 ML
BH CV ECHO MEAS - ESV(TEICH): 61.2 ML
BH CV ECHO MEAS - FS: 28.9 %
BH CV ECHO MEAS - IVS/LVPW: 1.1
BH CV ECHO MEAS - IVSD: 1.4 CM
BH CV ECHO MEAS - LA DIMENSION: 4.1 CM
BH CV ECHO MEAS - LA/AO: 1.4
BH CV ECHO MEAS - LAT PEAK E' VEL: 7.4 CM/SEC
BH CV ECHO MEAS - LV DIASTOLIC VOL/BSA (35-75): 96.4 ML/M^2
BH CV ECHO MEAS - LV MASS(C)D: 288.7 GRAMS
BH CV ECHO MEAS - LV MASS(C)DI: 142.8 GRAMS/M^2
BH CV ECHO MEAS - LV MAX PG: 5.3 MMHG
BH CV ECHO MEAS - LV MEAN PG: 3 MMHG
BH CV ECHO MEAS - LV SYSTOLIC VOL/BSA (12-30): 44.8 ML/M^2
BH CV ECHO MEAS - LV V1 MAX: 114.5 CM/SEC
BH CV ECHO MEAS - LV V1 MEAN: 80.5 CM/SEC
BH CV ECHO MEAS - LV V1 VTI: 25.5 CM
BH CV ECHO MEAS - LVIDD: 5.3 CM
BH CV ECHO MEAS - LVIDS: 3.8 CM
BH CV ECHO MEAS - LVLD AP4: 10.7 CM
BH CV ECHO MEAS - LVLS AP4: 9.3 CM
BH CV ECHO MEAS - LVOT AREA (M): 3.1 CM^2
BH CV ECHO MEAS - LVOT AREA: 3.1 CM^2
BH CV ECHO MEAS - LVOT DIAM: 2 CM
BH CV ECHO MEAS - LVPWD: 1.2 CM
BH CV ECHO MEAS - MED PEAK E' VEL: 4.05 CM/SEC
BH CV ECHO MEAS - MR MAX PG: 66.1 MMHG
BH CV ECHO MEAS - MR MAX VEL: 396.3 CM/SEC
BH CV ECHO MEAS - MV A MAX VEL: 99 CM/SEC
BH CV ECHO MEAS - MV DEC TIME: 0.4 SEC
BH CV ECHO MEAS - MV E MAX VEL: 81.3 CM/SEC
BH CV ECHO MEAS - MV E/A: 0.82
BH CV ECHO MEAS - RAP SYSTOLE: 5 MMHG
BH CV ECHO MEAS - RVSP: 24.5 MMHG
BH CV ECHO MEAS - SI(AO): 92.3 ML/M^2
BH CV ECHO MEAS - SI(CUBED): 47.8 ML/M^2
BH CV ECHO MEAS - SI(LVOT): 39.6 ML/M^2
BH CV ECHO MEAS - SI(MOD-SP4): 51.7 ML/M^2
BH CV ECHO MEAS - SI(TEICH): 37.3 ML/M^2
BH CV ECHO MEAS - SV(AO): 186.6 ML
BH CV ECHO MEAS - SV(CUBED): 96.6 ML
BH CV ECHO MEAS - SV(LVOT): 80.1 ML
BH CV ECHO MEAS - SV(MOD-SP4): 104.5 ML
BH CV ECHO MEAS - SV(TEICH): 75.3 ML
BH CV ECHO MEAS - TR MAX VEL: 221 CM/SEC
BH CV ECHO MEASUREMENTS AVERAGE E/E' RATIO: 14.2
LEFT ATRIUM VOLUME INDEX: 29 ML/M2
LEFT ATRIUM VOLUME: 58.6 CM3
LV EF 2D ECHO EST: 55 %
MAXIMAL PREDICTED HEART RATE: 146 BPM
STRESS TARGET HR: 124 BPM

## 2020-06-19 ENCOUNTER — HOSPITAL ENCOUNTER (OUTPATIENT)
Facility: HOSPITAL | Age: 75
Setting detail: OBSERVATION
Discharge: HOME | End: 2020-06-20
Attending: INTERNAL MEDICINE | Admitting: INTERNAL MEDICINE

## 2020-06-19 ENCOUNTER — APPOINTMENT (OUTPATIENT)
Dept: GENERAL RADIOLOGY | Facility: HOSPITAL | Age: 75
End: 2020-06-19

## 2020-06-19 ENCOUNTER — APPOINTMENT (OUTPATIENT)
Dept: ULTRASOUND IMAGING | Facility: HOSPITAL | Age: 75
End: 2020-06-19

## 2020-06-19 PROBLEM — S22.000A THORACIC COMPRESSION FRACTURE: Status: ACTIVE | Noted: 2020-06-19

## 2020-06-19 PROBLEM — Z79.01 CHRONIC ANTICOAGULATION: Status: ACTIVE | Noted: 2020-06-19

## 2020-06-19 PROBLEM — M54.50 LUMBAR BACK PAIN: Status: ACTIVE | Noted: 2020-06-19

## 2020-06-19 PROBLEM — R77.8 ELEVATED TROPONIN: Status: ACTIVE | Noted: 2020-06-19

## 2020-06-19 LAB
ANION GAP SERPL CALCULATED.3IONS-SCNC: 14 MMOL/L (ref 5–15)
BUN BLD-MCNC: 22 MG/DL (ref 8–23)
BUN/CREAT SERPL: 13.8 (ref 7–25)
CALCIUM SPEC-SCNC: 8.8 MG/DL (ref 8.6–10.5)
CHLORIDE SERPL-SCNC: 109 MMOL/L (ref 98–107)
CK SERPL-CCNC: 151 U/L (ref 20–200)
CO2 SERPL-SCNC: 19 MMOL/L (ref 22–29)
CREAT BLD-MCNC: 1.59 MG/DL (ref 0.76–1.27)
ETHANOL UR QL: <0.01 %
GFR SERPL CREATININE-BSD FRML MDRD: 43 ML/MIN/1.73
GLUCOSE BLD-MCNC: 114 MG/DL (ref 65–99)
POTASSIUM BLD-SCNC: 3.6 MMOL/L (ref 3.5–5.2)
SODIUM BLD-SCNC: 142 MMOL/L (ref 136–145)
TROPONIN T SERPL-MCNC: 0.11 NG/ML (ref 0–0.03)
TROPONIN T SERPL-MCNC: 0.16 NG/ML (ref 0–0.03)

## 2020-06-19 PROCEDURE — 93010 ELECTROCARDIOGRAM REPORT: CPT | Performed by: INTERNAL MEDICINE

## 2020-06-19 PROCEDURE — 84484 ASSAY OF TROPONIN QUANT: CPT | Performed by: INTERNAL MEDICINE

## 2020-06-19 PROCEDURE — 96361 HYDRATE IV INFUSION ADD-ON: CPT

## 2020-06-19 PROCEDURE — 80048 BASIC METABOLIC PNL TOTAL CA: CPT | Performed by: INTERNAL MEDICINE

## 2020-06-19 PROCEDURE — 80307 DRUG TEST PRSMV CHEM ANLYZR: CPT | Performed by: INTERNAL MEDICINE

## 2020-06-19 PROCEDURE — 72100 X-RAY EXAM L-S SPINE 2/3 VWS: CPT

## 2020-06-19 PROCEDURE — G0379 DIRECT REFER HOSPITAL OBSERV: HCPCS

## 2020-06-19 PROCEDURE — 93970 EXTREMITY STUDY: CPT | Performed by: SURGERY

## 2020-06-19 PROCEDURE — 93005 ELECTROCARDIOGRAM TRACING: CPT | Performed by: INTERNAL MEDICINE

## 2020-06-19 PROCEDURE — G0378 HOSPITAL OBSERVATION PER HR: HCPCS

## 2020-06-19 PROCEDURE — 96360 HYDRATION IV INFUSION INIT: CPT

## 2020-06-19 PROCEDURE — 82550 ASSAY OF CK (CPK): CPT | Performed by: INTERNAL MEDICINE

## 2020-06-19 PROCEDURE — 99214 OFFICE O/P EST MOD 30 MIN: CPT | Performed by: NURSE PRACTITIONER

## 2020-06-19 PROCEDURE — 93970 EXTREMITY STUDY: CPT

## 2020-06-19 PROCEDURE — 99213 OFFICE O/P EST LOW 20 MIN: CPT | Performed by: INTERNAL MEDICINE

## 2020-06-19 RX ORDER — SODIUM CHLORIDE 0.9 % (FLUSH) 0.9 %
10 SYRINGE (ML) INJECTION EVERY 12 HOURS SCHEDULED
Status: DISCONTINUED | OUTPATIENT
Start: 2020-06-19 | End: 2020-06-20 | Stop reason: HOSPADM

## 2020-06-19 RX ORDER — SODIUM CHLORIDE 0.9 % (FLUSH) 0.9 %
10 SYRINGE (ML) INJECTION AS NEEDED
Status: DISCONTINUED | OUTPATIENT
Start: 2020-06-19 | End: 2020-06-20 | Stop reason: HOSPADM

## 2020-06-19 RX ORDER — ASPIRIN 81 MG/1
81 TABLET ORAL DAILY
Status: DISCONTINUED | OUTPATIENT
Start: 2020-06-20 | End: 2020-06-20 | Stop reason: HOSPADM

## 2020-06-19 RX ORDER — ONDANSETRON 2 MG/ML
4 INJECTION INTRAMUSCULAR; INTRAVENOUS EVERY 6 HOURS PRN
Status: DISCONTINUED | OUTPATIENT
Start: 2020-06-19 | End: 2020-06-20 | Stop reason: HOSPADM

## 2020-06-19 RX ORDER — ROSUVASTATIN CALCIUM 20 MG/1
40 TABLET, COATED ORAL NIGHTLY
Status: DISCONTINUED | OUTPATIENT
Start: 2020-06-20 | End: 2020-06-20 | Stop reason: HOSPADM

## 2020-06-19 RX ORDER — NITROGLYCERIN 0.4 MG/1
0.4 TABLET SUBLINGUAL
Status: DISCONTINUED | OUTPATIENT
Start: 2020-06-19 | End: 2020-06-20 | Stop reason: HOSPADM

## 2020-06-19 RX ORDER — CARVEDILOL 6.25 MG/1
6.25 TABLET ORAL 2 TIMES DAILY WITH MEALS
Status: DISCONTINUED | OUTPATIENT
Start: 2020-06-19 | End: 2020-06-20 | Stop reason: HOSPADM

## 2020-06-19 RX ORDER — SODIUM CHLORIDE 9 MG/ML
75 INJECTION, SOLUTION INTRAVENOUS CONTINUOUS
Status: DISCONTINUED | OUTPATIENT
Start: 2020-06-19 | End: 2020-06-20 | Stop reason: HOSPADM

## 2020-06-19 RX ADMIN — SODIUM CHLORIDE, PRESERVATIVE FREE 10 ML: 5 INJECTION INTRAVENOUS at 15:17

## 2020-06-19 RX ADMIN — APIXABAN 5 MG: 5 TABLET, FILM COATED ORAL at 20:59

## 2020-06-19 RX ADMIN — SODIUM CHLORIDE 75 ML/HR: 9 INJECTION, SOLUTION INTRAVENOUS at 15:28

## 2020-06-19 RX ADMIN — CARVEDILOL 6.25 MG: 6.25 TABLET, FILM COATED ORAL at 17:14

## 2020-06-19 NOTE — H&P
[facility] Medicine Services  HISTORY AND PHYSICAL    Date of Admission: 6/19/2020  Primary Care Physician: Alex Haynes MD    Subjective     Chief Complaint: Elevated troponin.    History of Present Illness  Patient seen and examined in room 460.  He is a very poor historian.  Records obtained from him and charts.  Patient was transferred here from outside hospital with an elevated troponin over 0.2 with history of CAD and prior MI.  Per history from transferring provider patient presented there with shortness of breath and upper back pain.  He had an elevated d-dimer.  CTA was negative for PE or dissection.  He follows Dr. Elliott so he was transferred here for evaluation.  On examining patient however he tells me that he woke up this morning with low back pain and could not get a bed and his wife convinced him to go to the ER to get checked out.  He denies ever having chest pain or upper thoracic back pain today.  Denies any recent falls or trauma.  Denies any shortness of breath.  No recent fevers or chills.  No nausea or vomiting.  No abdominal pain.  No lower extremity numbness tingling or weakness.  Again as mentioned patient is a very poor historian.  He says his low back pain is new and he has never had it before.  I do note a previous history of chronic low back pain in our records however.  Patient is on Eliquis and when I asked him why he says he is not sure.  He says he thinks Dr. Elliott put him on it.  Later I was able to tease out that he may have had a lower extremity DVT in the past although this is unclear.  He is also otherwise unable to really tell me his home meds or problems.  Says he just sees Dr. Elliott and he takes care of him.      Review of Systems   Otherwise complete ROS reviewed and negative except as mentioned in the HPI.    Past Medical History:   Past Medical History:   Diagnosis Date   • AICD (automatic cardioverter/defibrillator) present    • CAD  (coronary artery disease)    • CAD in native artery     7/2015- ECHO: EF 50-55% (increased from 2/2013- 25%), abnormal LV diastolic function. 8/2015- LexiScan: negative for ischemia/scar 2/2013- Cath: Mild CAD, non-ischemic cardiomyopathy.   • Cancer (CMS/HCC)     melamona   • Chest pain, unspecified    • Chronic combined systolic and diastolic congestive heart failure (CMS/HCC)      7/2015- ECHO: EF 50-55% (increased from 2/2013- 25%), abnormal LV diastolic function.as    • COPD (chronic obstructive pulmonary disease) (CMS/HCC)    • FH: chemotherapy    • HLD (hyperlipidemia)    • Hx of scabies    • Hyperlipidemia, mixed    • Hypertension    • Hypertension, benign    • Immune deficiency disorder (CMS/HCC)    • Left bundle branch block    • Myocardial infarction (CMS/HCC)    • Non-ischemic cardiomyopathy (CMS/HCC)     LifeVest   • Status post implantation of automatic cardioverter/defibrillator (AICD)      Placed 4/2013. Interrogated by [facility].     Past Surgical History:  Past Surgical History:   Procedure Laterality Date   • A-V CARDIAC PACEMAKER INSERTION     • CARDIAC DEFIBRILLATOR PLACEMENT Left 02/05/2013    Sev dil nonischemic cardiomyopathy. Mild CAD.   • CORONARY ANGIOPLASTY     • LYMPH NODE BIOPSY      left leg lymp node bx 2/2016   • OTHER SURGICAL HISTORY  04/29/2013    BS BiV ICD Implantation   • SKIN CANCER EXCISION       Social History:  reports that he has quit smoking. His smoking use included cigarettes. He quit after 32.00 years of use. He has never used smokeless tobacco. He reports that he does not drink alcohol or use drugs.    Family History: family history includes Heart disease in his father and mother.       Allergies:  No Known Allergies  Medications:  Prior to Admission medications    Medication Sig Start Date End Date Taking? Authorizing Provider   acetaminophen (TYLENOL) 325 MG tablet Take 650 mg by mouth Every 6 (Six) Hours As Needed for Mild Pain (1-3).    Provider, Historical,  MD   apixaban (ELIQUIS) 5 MG tablet tablet Take 1 tablet by mouth 2 (Two) Times a Day. 7/13/17   Patrick Elliott MD   aspirin 81 MG tablet Take 81 mg by mouth Daily.    Anamaria Lr MD   budesonide-formoterol (SYMBICORT) 160-4.5 MCG/ACT inhaler Inhale 2 puffs 2 (Two) Times a Day.    Anamaria Lr MD   carvedilol (COREG) 25 MG tablet Take 25 mg by mouth 2 (Two) Times a Day With Meals.    Anamaria Lr MD   fenofibrate (TRICOR) 145 MG tablet Take 145 mg by mouth Daily.    Anamaria Lr MD   folic acid (FOLVITE) 1 MG tablet Take 1 mg by mouth Daily.    Anamaria Lr MD   furosemide (LASIX) 20 MG tablet Take 20 mg by mouth 2 (Two) Times a Day.    Anamaria Lr MD   HYDROcodone-acetaminophen (NORCO) 5-325 MG per tablet TK 1 T PO Q 8 HOURS PRN P 1/13/19   Anamaria Lr MD   HYDROcodone-acetaminophen (NORCO) 7.5-325 MG per tablet Take 1 tablet by mouth Every 6 (Six) Hours As Needed for moderate pain (4-6).    Anamaria Lr MD   ipratropium-albuterol (COMBIVENT RESPIMAT)  MCG/ACT inhaler Inhale 1 puff 4 (Four) Times a Day As Needed for wheezing.    Anamaria Lr MD   lisinopril (PRINIVIL,ZESTRIL) 40 MG tablet Take 40 mg by mouth Daily.    Anamaria Lr MD   nitroglycerin (NITROSTAT) 0.4 MG SL tablet Place 0.4 mg under the tongue Every 5 (Five) Minutes As Needed for chest pain. Take no more than 3 doses in 15 minutes.    Anamaria Lr MD   ondansetron ODT (ZOFRAN-ODT) 4 MG disintegrating tablet Take 1 tablet by mouth 4 (Four) Times a Day As Needed for Nausea or Vomiting. 6/5/17   Cullen Sauceda MD   oxyCODONE-acetaminophen (PERCOCET) 5-325 MG per tablet Take 1 tablet by mouth Every 6 (Six) Hours As Needed.    Anamaria Lr MD   POTASSIUM CHLORIDE ER PO Take 10 mEq by mouth.    Anamaria Lr MD   rosuvastatin (CRESTOR) 40 MG tablet Take 40 mg by mouth Daily.    Provider, Anamaria, MD     Objective     Vital  "Signs: BP 95/44 (BP Location: Left arm, Patient Position: Lying)   Pulse 78   Resp 22   Ht 172.7 cm (68\")   Wt 87.2 kg (192 lb 3 oz)   SpO2 92%   BMI 29.22 kg/m²   Physical Exam  GEN: Awake, alert, interactive, in NAD  HEENT:  PERRLA, EOMI, Anicteric, Trachea midline  Lungs: CTAB, no wheezing/rales/rhonchi  Heart: RRR, +S1/s2, no rub  ABD: soft, nt/nd, +BS, no guarding/rebound  Extremities: atraumatic, no cyanosis, no edema  Skin: no rashes or lesions  Neuro: AAOx3, no focal deficits  Psych: normal mood & affect        Results Reviewed:  Labs, imaging, ekg from outside facility reviewed.    2d echo Interpretation Summary from 2/27/2020    · Left ventricular wall thickness is consistent with mild concentric hypertrophy.  · Estimated EF = 55%.  · Left ventricular systolic function is normal.  · Left ventricular diastolic dysfunction.  · No evidence of pulmonary hypertension is present.         Assessment / Plan     Assessment:   Active Hospital Problems    Diagnosis   • **Elevated troponin   • Chronic anticoagulation   • Thoracic compression fracture (CMS/HCC)   • Lumbar back pain   • AICD (automatic cardioverter/defibrillator) present   • Non-ischemic cardiomyopathy (CMS/HCC)   • HLD (hyperlipidemia)         Plan:   #1 elevated troponin - unclear significance at this time.  Has prior history of CAD and MI but is had no chest pain currently.  CTA negative for PE.  EKG without any acute ST-T changes at outside facility.  Will recheck troponin now and trend x2.  Recheck EKG now and trend x2.  Cardiology consult for evaluation.    #2 chronic anticoagulation -patient is on Eliquis twice daily but he is not sure why.  He says he thinks Dr. Elliott put him on it.  He denies any previous arrhythmias.  He may have had a DVT.  He does have an elevated d-dimer and a negative CTA of the chest so I will get bilateral lower extremity Dopplers to look for DVT.    #3 nonischemic cardiomyopathy -with history of AICD.  Last echo " showed an EF of 55% with some diastolic dysfunction.  Blood pressure soft on arrival.  Will restart his Coreg at half dose.  Hold lisinopril for now.  Hold diuretic for now.    #4 lumbar back pain -patient says new without any recent trauma or falls.  We have some documentation here previously that he had chronic low back pain however.  Will check an x-ray as one was not done at outside facility.  Of note patient did have an x-ray of his chest which showed a thoracic compression fracture which was age-indeterminate but not there prior on imaging.  However he denies any thoracic pain currently.  No lower extremity symptoms currently.  No weakness or numbness.  No loss of bowel or bladder.    #5 hyperlipidemia -statin      Code Status: Full code     I discussed the patient's findings and my recommendations with the patient directly at bedside    Estimated length of stay <2 days    Patient seen and examined by me on 6/19/2020 at 12:30 PM.    Lee Jama DO   06/19/20   13:13

## 2020-06-20 ENCOUNTER — APPOINTMENT (OUTPATIENT)
Dept: CARDIOLOGY | Facility: HOSPITAL | Age: 75
End: 2020-06-20

## 2020-06-20 VITALS
DIASTOLIC BLOOD PRESSURE: 53 MMHG | HEIGHT: 68 IN | RESPIRATION RATE: 18 BRPM | SYSTOLIC BLOOD PRESSURE: 119 MMHG | BODY MASS INDEX: 29.57 KG/M2 | WEIGHT: 195.11 LBS | HEART RATE: 70 BPM | OXYGEN SATURATION: 94 % | TEMPERATURE: 98 F

## 2020-06-20 PROBLEM — N18.30 CKD (CHRONIC KIDNEY DISEASE), STAGE III (HCC): Status: ACTIVE | Noted: 2020-06-20

## 2020-06-20 LAB
ANION GAP SERPL CALCULATED.3IONS-SCNC: 14 MMOL/L (ref 5–15)
BH CV STRESS BP STAGE 1: NORMAL
BH CV STRESS COMMENTS STAGE 1: NORMAL
BH CV STRESS COMMENTS STAGE 2: NORMAL
BH CV STRESS DOSE REGADENOSON STAGE 1: 0.4
BH CV STRESS DURATION MIN STAGE 1: 0
BH CV STRESS DURATION MIN STAGE 2: 4
BH CV STRESS DURATION SEC STAGE 1: 10
BH CV STRESS DURATION SEC STAGE 2: 0
BH CV STRESS HR STAGE 1: 70
BH CV STRESS PROTOCOL 1: NORMAL
BH CV STRESS RECOVERY BP: NORMAL MMHG
BH CV STRESS RECOVERY HR: 75 BPM
BH CV STRESS STAGE 1: 1
BH CV STRESS STAGE 2: 2
BUN BLD-MCNC: 25 MG/DL (ref 8–23)
BUN/CREAT SERPL: 19.2 (ref 7–25)
CALCIUM SPEC-SCNC: 8.7 MG/DL (ref 8.6–10.5)
CHLORIDE SERPL-SCNC: 108 MMOL/L (ref 98–107)
CO2 SERPL-SCNC: 19 MMOL/L (ref 22–29)
CREAT BLD-MCNC: 1.3 MG/DL (ref 0.76–1.27)
DEPRECATED RDW RBC AUTO: 44.6 FL (ref 37–54)
ERYTHROCYTE [DISTWIDTH] IN BLOOD BY AUTOMATED COUNT: 13.9 % (ref 12.3–15.4)
GFR SERPL CREATININE-BSD FRML MDRD: 54 ML/MIN/1.73
GLUCOSE BLD-MCNC: 101 MG/DL (ref 65–99)
HCT VFR BLD AUTO: 34.8 % (ref 37.5–51)
HGB BLD-MCNC: 11.9 G/DL (ref 13–17.7)
LV EF NUC BP: 56 %
MAGNESIUM SERPL-MCNC: 2 MG/DL (ref 1.6–2.4)
MAXIMAL PREDICTED HEART RATE: 146 BPM
MCH RBC QN AUTO: 29.8 PG (ref 26.6–33)
MCHC RBC AUTO-ENTMCNC: 34.2 G/DL (ref 31.5–35.7)
MCV RBC AUTO: 87.2 FL (ref 79–97)
PERCENT MAX PREDICTED HR: 47.95 %
PLATELET # BLD AUTO: 102 10*3/MM3 (ref 140–450)
PMV BLD AUTO: 9.8 FL (ref 6–12)
POTASSIUM BLD-SCNC: 3.4 MMOL/L (ref 3.5–5.2)
RBC # BLD AUTO: 3.99 10*6/MM3 (ref 4.14–5.8)
SODIUM BLD-SCNC: 141 MMOL/L (ref 136–145)
STRESS BASELINE BP: NORMAL MMHG
STRESS BASELINE HR: 70 BPM
STRESS PERCENT HR: 56 %
STRESS POST EXERCISE DUR SEC: 10 SEC
STRESS POST PEAK BP: NORMAL MMHG
STRESS POST PEAK HR: 70 BPM
STRESS TARGET HR: 124 BPM
WBC NRBC COR # BLD: 5.91 10*3/MM3 (ref 3.4–10.8)

## 2020-06-20 PROCEDURE — 85027 COMPLETE CBC AUTOMATED: CPT | Performed by: INTERNAL MEDICINE

## 2020-06-20 PROCEDURE — A9500 TC99M SESTAMIBI: HCPCS | Performed by: INTERNAL MEDICINE

## 2020-06-20 PROCEDURE — 83735 ASSAY OF MAGNESIUM: CPT | Performed by: INTERNAL MEDICINE

## 2020-06-20 PROCEDURE — 25010000002 REGADENOSON 0.4 MG/5ML SOLUTION: Performed by: INTERNAL MEDICINE

## 2020-06-20 PROCEDURE — 96361 HYDRATE IV INFUSION ADD-ON: CPT

## 2020-06-20 PROCEDURE — 80048 BASIC METABOLIC PNL TOTAL CA: CPT | Performed by: INTERNAL MEDICINE

## 2020-06-20 PROCEDURE — G0378 HOSPITAL OBSERVATION PER HR: HCPCS

## 2020-06-20 PROCEDURE — 0 TECHNETIUM SESTAMIBI: Performed by: INTERNAL MEDICINE

## 2020-06-20 PROCEDURE — 93018 CV STRESS TEST I&R ONLY: CPT | Performed by: INTERNAL MEDICINE

## 2020-06-20 PROCEDURE — 93017 CV STRESS TEST TRACING ONLY: CPT

## 2020-06-20 PROCEDURE — 78452 HT MUSCLE IMAGE SPECT MULT: CPT

## 2020-06-20 PROCEDURE — 78452 HT MUSCLE IMAGE SPECT MULT: CPT | Performed by: INTERNAL MEDICINE

## 2020-06-20 RX ORDER — DIPHENHYDRAMINE HCL 25 MG
25 CAPSULE ORAL EVERY 6 HOURS PRN
COMMUNITY

## 2020-06-20 RX ORDER — POTASSIUM CHLORIDE 750 MG/1
40 CAPSULE, EXTENDED RELEASE ORAL ONCE
Status: COMPLETED | OUTPATIENT
Start: 2020-06-20 | End: 2020-06-20

## 2020-06-20 RX ORDER — AMMONIUM LACTATE 120 MG/G
1 CREAM TOPICAL 2 TIMES DAILY PRN
COMMUNITY

## 2020-06-20 RX ORDER — ACETAMINOPHEN 160 MG
2000 TABLET,DISINTEGRATING ORAL DAILY
COMMUNITY

## 2020-06-20 RX ADMIN — TECHNETIUM TC 99M SESTAMIBI 1 DOSE: 1 INJECTION INTRAVENOUS at 11:30

## 2020-06-20 RX ADMIN — APIXABAN 5 MG: 5 TABLET, FILM COATED ORAL at 09:42

## 2020-06-20 RX ADMIN — CARVEDILOL 6.25 MG: 6.25 TABLET, FILM COATED ORAL at 09:41

## 2020-06-20 RX ADMIN — POTASSIUM CHLORIDE 40 MEQ: 750 CAPSULE, EXTENDED RELEASE ORAL at 09:40

## 2020-06-20 RX ADMIN — TECHNETIUM TC 99M SESTAMIBI 1 DOSE: 1 INJECTION INTRAVENOUS at 10:30

## 2020-06-20 RX ADMIN — ASPIRIN 81 MG: 81 TABLET ORAL at 09:41

## 2020-06-20 RX ADMIN — REGADENOSON 0.4 MG: 0.08 INJECTION, SOLUTION INTRAVENOUS at 11:27

## 2020-06-20 NOTE — PLAN OF CARE
"  Problem: Patient Care Overview  Goal: Plan of Care Review  Outcome: Ongoing (interventions implemented as appropriate)  Flowsheets (Taken 6/20/2020 2260)  Progress: improving  Plan of Care Reviewed With: patient  Note:   Troponin trended down - NPO for Stress today 6/20 - unkempt did not want to wear hospital gown - had \"accident\" of urine in bed - VSS - up with 1 assist for safety - may need PT/OT to decide if safe for home - IVF infusing - says wife is in ill health poss CA - no acute issues - will cont to monitor     "

## 2020-06-20 NOTE — PROGRESS NOTES
LOS: 0 days   Patient Care Team:  Alex Haynes MD as PCP - General  Patrick Elliott MD as PCP - Claims Attributed  Patrick Elliott MD as Cardiologist (Cardiology)    Chief Complaint:   Elevated troponin    HLD (hyperlipidemia)    Non-ischemic cardiomyopathy (CMS/HCC)    AICD (automatic cardioverter/defibrillator) present    Chronic anticoagulation    Thoracic compression fracture (CMS/HCC)    Lumbar back pain         Subjective    Wilmar Reyes Jr. is a 74 y.o. malewho is being seen in follow-up  Overnight no new events or occurrences  Denies chest pain  No palpitation  No presyncope  No syncope  No orthopnea  No paroxysmal nocturnal dyspnea  Hemodynamically stable  DVT study is negative  Has some back pain issues that appears to be chronic    Telemetry: no malignant arrhythmia. No significant pauses.    Review of Systems     Constitutional: No chills   Has fatigue   No fever.     HENT: Negative.    Eyes: Negative.      Respiratory: Negative for cough,   No chest wall soreness,   Shortness of breath,   no wheezing, no stridor.      Cardiovascular: As above    Gastrointestinal: Negative for abdominal distention,  No abdominal pain,   No blood in stool,   No constipation,   No diarrhea,   No nausea   No vomiting.     Endocrine: Negative.    Genitourinary: Negative for difficulty urinating, dysuria, flank pain and hematuria.     Musculoskeletal: Negative.    Skin: Negative for rash and wound.   Allergic/Immunologic: Negative.      Neurological: Negative for dizziness, syncope, weakness,   No light-headedness  No  headaches.     Hematological: Does not bruise/bleed easily.     Psychiatric/Behavioral: Negative for agitation or behavioral problems,   No confusion,   the patient is  nervous/anxious.       History:   Past Medical History:   Diagnosis Date   • AICD (automatic cardioverter/defibrillator) present    • CAD (coronary artery disease)    • CAD in native artery     7/2015- ECHO: EF 50-55% (increased from 2/2013-  25%), abnormal LV diastolic function. 8/2015- LexiScan: negative for ischemia/scar 2/2013- Cath: Mild CAD, non-ischemic cardiomyopathy.   • Cancer (CMS/HCC)     melamona   • Chest pain, unspecified    • Chronic combined systolic and diastolic congestive heart failure (CMS/HCC)      7/2015- ECHO: EF 50-55% (increased from 2/2013- 25%), abnormal LV diastolic function.as    • COPD (chronic obstructive pulmonary disease) (CMS/HCC)    • FH: chemotherapy    • HLD (hyperlipidemia)    • Hx of scabies    • Hyperlipidemia, mixed    • Hypertension    • Hypertension, benign    • Immune deficiency disorder (CMS/HCC)    • Left bundle branch block    • Myocardial infarction (CMS/HCC)    • Non-ischemic cardiomyopathy (CMS/HCC)     LifeVest   • Status post implantation of automatic cardioverter/defibrillator (AICD)      Placed 4/2013. Interrogated by [facility].     Past Surgical History:   Procedure Laterality Date   • A-V CARDIAC PACEMAKER INSERTION     • CARDIAC DEFIBRILLATOR PLACEMENT Left 02/05/2013    Sev dil nonischemic cardiomyopathy. Mild CAD.   • CORONARY ANGIOPLASTY     • LYMPH NODE BIOPSY      left leg lymp node bx 2/2016   • OTHER SURGICAL HISTORY  04/29/2013    BS BiV ICD Implantation   • SKIN CANCER EXCISION       Social History     Socioeconomic History   • Marital status:      Spouse name: Not on file   • Number of children: Not on file   • Years of education: Not on file   • Highest education level: Not on file   Tobacco Use   • Smoking status: Former Smoker     Years: 32.00     Types: Cigarettes   • Smokeless tobacco: Never Used   Substance and Sexual Activity   • Alcohol use: No   • Drug use: No   • Sexual activity: Defer     Family History   Problem Relation Age of Onset   • Heart disease Mother    • Heart disease Father        Labs:  WBC WBC   Date Value Ref Range Status   06/20/2020 5.91 3.40 - 10.80 10*3/mm3 Final      HGB Hemoglobin   Date Value Ref Range Status   06/20/2020 11.9 (L) 13.0 - 17.7  g/dL Final      HCT Hematocrit   Date Value Ref Range Status   06/20/2020 34.8 (L) 37.5 - 51.0 % Final      Platelets Platelets   Date Value Ref Range Status   06/20/2020 102 (L) 140 - 450 10*3/mm3 Final      MCV MCV   Date Value Ref Range Status   06/20/2020 87.2 79.0 - 97.0 fL Final        Results from last 7 days   Lab Units 06/20/20  0334 06/19/20  1311   SODIUM mmol/L 141 142   POTASSIUM mmol/L 3.4* 3.6   CHLORIDE mmol/L 108* 109*   CO2 mmol/L 19.0* 19.0*   BUN mg/dL 25* 22   CREATININE mg/dL 1.30* 1.59*   CALCIUM mg/dL 8.7 8.8   GLUCOSE mg/dL 101* 114*     Lab Results   Component Value Date    CKTOTAL 151 06/19/2020    CKMB 0.22 06/05/2017    TROPONINI 0.015 05/23/2019    TROPONINT 0.111 (C) 06/19/2020     PT/INR:  No results found for: PROTIME/No results found for: INR    Imaging Results (Last 72 Hours)     Procedure Component Value Units Date/Time    US Venous Doppler Lower Extremity Bilateral (duplex) [[id number]] Collected:  06/19/20 1444     Updated:  06/19/20 1448    Narrative:       History: Swelling       Impression:       Impression: There is no evidence of deep venous thrombosis or  superficial thrombophlebitis of right or left lower extremities.     Comments: Bilateral lower extremity venous duplex exam was performed  using color Doppler flow, Doppler waveform analysis, and grayscale  imaging, with and without compression. There is no evidence of deep  venous thrombosis in the common femoral, superficial femoral, popliteal,  peroneal, anterior tibial, and posterior tibial veins bilaterally. No  thrombus is identified in the saphenofemoral junctions and greater  saphenous veins bilaterally.         This report was finalized on 06/19/2020 14:44 by Dr. Tho Holbrook MD.    XR Spine Lumbar 2 or 3 View [[id number]] Collected:  06/19/20 1436     Updated:  06/19/20 1440    Narrative:       EXAMINATION: XR SPINE LUMBAR 2 OR 3 VW-     6/19/2020 2:25 PM CDT     HISTORY: lumbar back pain     Three-view  lumbar spine series.     Curvature and alignment is appropriate.  No compression fracture.     Prominent endplate spurring.     Prominent degenerative facet joint sclerosis and hypertrophy at the  lower 3 lumbar levels.     Aortic calcification is present.     No scoliosis.  SI joints are symmetric.     Residual IV contrast seen within the kidneys and urinary bladder.     Summary:  1. Degenerative endplate and facet joint changes.  2. No acute bony abnormality.  This report was finalized on 06/19/2020 14:36 by Dr. Molina Horn MD.          Objective     No Known Allergies    Medication Review: Performed  Current Facility-Administered Medications   Medication Dose Route Frequency Provider Last Rate Last Dose   • apixaban (ELIQUIS) tablet 5 mg  5 mg Oral Q12H Lee Jama DO   5 mg at 06/19/20 2059   • aspirin EC tablet 81 mg  81 mg Oral Daily Lee Jama DO       • carvedilol (COREG) tablet 6.25 mg  6.25 mg Oral BID With Meals Lee Jama DO   6.25 mg at 06/19/20 1714   • nitroglycerin (NITROSTAT) SL tablet 0.4 mg  0.4 mg Sublingual Q5 Min PRN Lee Jama DO       • ondansetron (ZOFRAN) injection 4 mg  4 mg Intravenous Q6H PRN Lee Jama DO       • potassium chloride (MICRO-K) CR capsule 40 mEq  40 mEq Oral Once Tila Zendejas, APRN       • rosuvastatin (CRESTOR) tablet 40 mg  40 mg Oral Nightly Lee Jama DO       • sodium chloride 0.9 % flush 10 mL  10 mL Intravenous Q12H Lee Jama DO   10 mL at 06/19/20 1517   • sodium chloride 0.9 % flush 10 mL  10 mL Intravenous PRN Lee Jama DO       • sodium chloride 0.9 % infusion  75 mL/hr Intravenous Continuous Lee Jama DO 75 mL/hr at 06/20/20 0611 75 mL/hr at 06/20/20 0611       Vital Sign Min/Max for last 24 hours  Temp  Min: 97.4 °F (36.3 °C)  Max: 98.9 °F (37.2 °C)   BP  Min: 95/44  Max: 134/49   Pulse  Min: 54  Max: 78   Resp  Min: 18  Max: 22   SpO2  Min: 92 %  Max: 95 %   No data recorded   Weight   "Min: 87.2 kg (192 lb 3 oz)  Max: 88.5 kg (195 lb 3.2 oz)     Flowsheet Rows      First Filed Value   Admission Height  172.7 cm (68\") Documented at 06/19/2020 1154   Admission Weight  87.2 kg (192 lb 3 oz) Documented at 06/19/2020 1154          Results for orders placed during the hospital encounter of 02/27/20   Adult Transthoracic Echo Complete W/ Cont if Necessary Per Protocol    Narrative · Left ventricular wall thickness is consistent with mild concentric   hypertrophy.  · Estimated EF = 55%.  · Left ventricular systolic function is normal.  · Left ventricular diastolic dysfunction.  · No evidence of pulmonary hypertension is present.          Physical Exam:    General Appearance: Awake, alert, in no acute distress  Eyes: Pupils equal and reactive    Ears: Appear intact with no abnormalities noted  Nose: Nares normal, no drainage  Neck: supple, trachea midline, no carotid bruit and no JVD  Back: no kyphosis present,    Lungs: respirations regular, respirations even and respirations unlabored    Heart: normal S1, S2,  2/6 pansystolic murmur in the left sternal border,    no rub and no click    Abdomen: normal bowel sounds, no tenderness   Skin: no bleeding, bruising or rash  Extremities: no cyanosis  Psychiatric/Behavioral: Negative for agitation, behavioral problems, confusion, the patient does  appear to be nervous/anxious.          Results Review:   I reviewed the patient's new clinical results.  I reviewed the patient's new imaging results and agree with the interpretation.  I reviewed the patient's other test results and agree with the interpretation  I personally viewed and interpreted the patient's EKG/Telemetry data  Discussed with patient    Reviewed available prior notes, consults, prior visits, laboratory findings, radiology and cardiology relevant reports.   Updated chart as applicable.   I have reviewed the patient's medical history in detail and updated the computerized patient record as relevant.  "     Updated patient regarding any new or relevant abnormalities on review of records or any new findings on physical exam.   Mentioned to patient about purpose of visit and desirable health short and long term goals and objectives.     Assessment/Plan       Elevated troponin    HLD (hyperlipidemia)    Non-ischemic cardiomyopathy (CMS/HCC)    AICD (automatic cardioverter/defibrillator) present    Chronic anticoagulation    Thoracic compression fracture (CMS/HCC)    Lumbar back pain  Mild troponin elevation of uncertain significance      Plan    Will require Lexiscan Cardiolite stress test for further risk evaluation  Continue on current medications  Repeat limited echocardiogram  Treat and monitor hypokalemia    Telemetry  Deep vein thrombosis prophylaxis/precautions [on anticoagulation]  Appropriate diet, fluid, sodium, caffeine, stimulants intake   Questions were encouraged, asked and answered to the patient's  understanding and satisfaction.  Compliance to diet and medications       Patrick Elliott MD  06/20/20  07:53    EMR Dragon/Transcription was used to dictate part of this note

## 2020-06-20 NOTE — DISCHARGE SUMMARY
[facility] Medicine Services  DISCHARGE SUMMARY     Date of Admission: 6/19/2020  Date of Discharge:  6/20/2020  Primary Care Physician: Alex Haynes MD    Presenting Problem/History of Present Illness:  Elevated troponin [R79.89]     Discharge Diagnoses:  Active Hospital Problems    Diagnosis   • **Elevated troponin   • CKD (chronic kidney disease), stage III (CMS/HCC)   • Chronic anticoagulation   • Thoracic compression fracture (CMS/HCC)   • Lumbar back pain   • AICD (automatic cardioverter/defibrillator) present   • Non-ischemic cardiomyopathy (CMS/HCC)   • HLD (hyperlipidemia)     Chief Complaint on Day of Discharge: No complaints.  History of Present Illness on Day of Discharge:   Lying in bed.  No family in room.  Reports chronic low back pain that he takes [facility].  Dr. Elliott consulted and ordered Lexiscan which reported small ischemia with a normal ejection fraction recommendations for medical therapy and follow-up with Dr. Elliott.  Results discussed with patient.  Also patient will follow-up with  may follow-up on thoracic mild compression deformities new since 2016.  Patient denies chest pain, palpitations, or shortness of breath.  No oxygen in use.  Denies nausea, vomiting or abdominal pain.    Consults: Dr. Elliott, cardiology    Procedures Performed:   Stress test with myocardial perfusion 6/20/2020  · Diaphragmatic attenuation artifact is present.  · Left ventricular ejection fraction is normal (Calculated EF = 56%).  · Myocardial perfusion imaging indicates a small-to-medium-sized infarct located in the lateral wall and apex with smal nan-infarct ischemia noted.     Pertinent Test Results:   Laboratory Data:    Results from last 7 days   Lab Units 06/20/20  0334   WBC 10*3/mm3 5.91   HEMOGLOBIN g/dL 11.9*   HEMATOCRIT % 34.8*   PLATELETS 10*3/mm3 102*        Results from last 7 days   Lab Units 06/20/20  0334 06/19/20  1311    SODIUM mmol/L 141 142   POTASSIUM mmol/L 3.4* 3.6   CHLORIDE mmol/L 108* 109*   CO2 mmol/L 19.0* 19.0*   BUN mg/dL 25* 22   CREATININE mg/dL 1.30* 1.59*   CALCIUM mg/dL 8.7 8.8   GLUCOSE mg/dL 101* 114*     Lab Results (all)     Procedure Component Value Units Date/Time    Basic Metabolic Panel [[id number]]  (Abnormal) Collected:  06/20/20 0334    Specimen:  Blood Updated:  06/20/20 0421     Glucose 101 mg/dL      BUN 25 mg/dL      Creatinine 1.30 mg/dL      Sodium 141 mmol/L      Potassium 3.4 mmol/L      Chloride 108 mmol/L      CO2 19.0 mmol/L      Calcium 8.7 mg/dL      eGFR Non African Amer 54 mL/min/1.73      BUN/Creatinine Ratio 19.2     Anion Gap 14.0 mmol/L     Narrative:       GFR Normal >60  Chronic Kidney Disease <60  Kidney Failure <15      Magnesium [[id number]]  (Normal) Collected:  06/20/20 0334    Specimen:  Blood Updated:  06/20/20 0421     Magnesium 2.0 mg/dL     CBC (No Diff) [[id number]]  (Abnormal) Collected:  06/20/20 0334    Specimen:  Blood Updated:  06/20/20 0408     WBC 5.91 10*3/mm3      RBC 3.99 10*6/mm3      Hemoglobin 11.9 g/dL      Hematocrit 34.8 %      MCV 87.2 fL      MCH 29.8 pg      MCHC 34.2 g/dL      RDW 13.9 %      RDW-SD 44.6 fl      MPV 9.8 fL      Platelets 102 10*3/mm3     Troponin [[id number]]  (Abnormal) Collected:  06/19/20 1552    Specimen:  Blood Updated:  06/19/20 1650     Troponin T 0.111 ng/mL     Narrative:       Troponin T Reference Range:  <= 0.03 ng/mL-   Negative for AMI  >0.03 ng/mL-     Abnormal for myocardial necrosis.  Clinicians would have to utilize clinical acumen, EKG, Troponin and serial changes to determine if it is an Acute Myocardial Infarction or myocardial injury due to an underlying chronic condition.       Results may be falsely decreased if patient taking Biotin.      CK [[id number]]  (Normal) Collected:  06/19/20 1311    Specimen:  Blood Updated:  06/19/20 1353     Creatine Kinase 151 U/L     Troponin [[id number]]  (Abnormal) Collected:  06/19/20  1311    Specimen:  Blood Updated:  06/19/20 1353     Troponin T 0.158 ng/mL     Narrative:       Troponin T Reference Range:  <= 0.03 ng/mL-   Negative for AMI  >0.03 ng/mL-     Abnormal for myocardial necrosis.  Clinicians would have to utilize clinical acumen, EKG, Troponin and serial changes to determine if it is an Acute Myocardial Infarction or myocardial injury due to an underlying chronic condition.       Results may be falsely decreased if patient taking Biotin.      Basic Metabolic Panel [[id number]]  (Abnormal) Collected:  06/19/20 1311    Specimen:  Blood Updated:  06/19/20 1351     Glucose 114 mg/dL      BUN 22 mg/dL      Creatinine 1.59 mg/dL      Sodium 142 mmol/L      Potassium 3.6 mmol/L      Chloride 109 mmol/L      CO2 19.0 mmol/L      Calcium 8.8 mg/dL      eGFR Non African Amer 43 mL/min/1.73      BUN/Creatinine Ratio 13.8     Anion Gap 14.0 mmol/L     Narrative:       GFR Normal >60  Chronic Kidney Disease <60  Kidney Failure <15      Ethanol [[id number]] Collected:  06/19/20 1311    Specimen:  Blood Updated:  06/19/20 1349     Ethanol % <0.010 %     Narrative:       Not for legal purposes. Chain of Custody not followed.         Culture Data:   No results found for: BLOODCX, URINECX, WOUNDCX, MRSACX, RESPCX, STOOLCX  Imaging Results (All)     Procedure Component Value Units Date/Time    US Venous Doppler Lower Extremity Bilateral (duplex) [[id number]] Collected:  06/19/20 1444     Updated:  06/19/20 1448    Narrative:       History: Swelling       Impression:       Impression: There is no evidence of deep venous thrombosis or  superficial thrombophlebitis of right or left lower extremities.     Comments: Bilateral lower extremity venous duplex exam was performed  using color Doppler flow, Doppler waveform analysis, and grayscale  imaging, with and without compression. There is no evidence of deep  venous thrombosis in the common femoral, superficial femoral, popliteal,  peroneal, anterior tibial,  and posterior tibial veins bilaterally. No  thrombus is identified in the saphenofemoral junctions and greater  saphenous veins bilaterally.         This report was finalized on 06/19/2020 14:44 by Dr. Tho Holbrook MD.    XR Spine Lumbar 2 or 3 View [[id number]] Collected:  06/19/20 1436     Updated:  06/19/20 1440    Narrative:       EXAMINATION: XR SPINE LUMBAR 2 OR 3 VW-     6/19/2020 2:25 PM CDT     HISTORY: lumbar back pain     Three-view lumbar spine series.     Curvature and alignment is appropriate.  No compression fracture.     Prominent endplate spurring.     Prominent degenerative facet joint sclerosis and hypertrophy at the  lower 3 lumbar levels.     Aortic calcification is present.     No scoliosis.  SI joints are symmetric.     Residual IV contrast seen within the kidneys and urinary bladder.     Summary:  1. Degenerative endplate and facet joint changes.  2. No acute bony abnormality.  This report was finalized on 06/19/2020 14:36 by Dr. Molina Horn MD.        Hospital Course  Patient is a 74 y.o. male transferred from [facility]  with elevated troponin.  Patient presented to outside facility with back pain and was noted to have an elevated d-dimer.  CT angiogram was negative for pulmonary embolism.  No thoracic aortic aneurysm and no thoracic aortic dissection.  Borderline cardiomegaly.  No evidence for pneumonia and no pulmonary edema.  X-ray showed no acute cardiopulmonary process.  Questionable some age-indeterminate minimal mid thoracic vertebral compression deformities not clearly present 2016.  Patient is followed by Dr. Elliott with cardiology.  Patient reported low back pain and had difficulty getting out of the bed and his wife advised him to seek attention in the emergency room.  Patient denied having chest pain or thoracic back pain.  He denied recent fall or trauma.  Patient denied shortness of breath, fever, chills, nausea, vomiting, abdominal pain.  He denied lower extremity  numbness, tingling or weakness.  Patient poor historian and often gave conflicting answers to questions.    Troponin at outside facility 0.266.  Patient denied chest pain.  EKG revealed normal sinus rhythm with ventricular pacing.  Troponin at our facility 0.158.  Patient denies shortness of breath, orthopnea, palpitations or syncope.  Patient reported main reason for presenting to the emergency room was acute on chronic low back pain that he describes sharp in nature.  He is followed by Dr. Elliott for severe dilated nonischemic cardiomyopathy.  In 2013 ejection fraction 25%.  Ejection fraction July 2015 normal.  Echo February 2020 ejection fraction 55%.  Nuclear stress test December 2018 low risk for ischemia.  Patient has biventricular pacemaker and interrogations [facility] [place].  Patient on chronic anticoagulation with Eliquis due to paroxysmal atrial fibrillation on previous ICD interrogations.    He was admitted to the telemetry floor with elevated troponin at outside facility.  Unknown significance as patient denied any episode of chest pain.  Troponins trended at 0.158 and 0.111.  Again patient denied any episodes of chest pain.  Cardiology consulted and he was seen by Dr. Elliott as he has history of nonischemic cardiomyopathy with AICD followed by Dr. Elliott.  Patient remained paced rhythm without arrhythmias.  Lexiscan 6/20/2020 per Dr. Elliott small ischemia with normal ejection fraction.  Recommend medical therapy and follow-up with Dr. Elliott in 4 to 6 weeks.    Creatinine 1.59 on admission.  He was hydrated with IV fluids.  Creatinine improved 1.3.  Reviewed lab work and creatinine 1.1 on 6/5/2017.  No other recent lab for comparison.  Chronic kidney disease stage III.  [facility]    Blood pressure 95/44 on admission and Coreg resumed at decreased dose.  Lisinopril held on admission as unknown baseline creatinine.  Blood pressure trended up to 134/48.  Resume Coreg and lisinopril  "at discharge as patient is followed by cardiology for nonischemic cardiomyopathy and has pacemaker.    Venous Dopplers negative for DVT.    Patient denied any complaints of chest pain.  Patient tells me that he was evaluated in the emergency room at outside facility for acute on chronic low back pain and he is [facility] the [facility] which he continues to take.    On 6/20/2020 he has been evaluated by Dr. Elliott and cleared for discharge.  Troponin with flat trend.  Lexiscan 6/20 reviewed by Dr. Elliott with small ischemia and normal ejection fraction and recommendations to continue medical therapy and follow-up with Dr. Elliott in 4 weeks.  Patient denies any episodes of chest pain or palpitations.  He will follow-up with Dr. Stokes 1 week to follow-up on question of age indeterminate minimal mid thoracic vertebral compression deformities not identified 2016.  Again, patient denies thoracic back pain but does report report chronic low lumbar back pain.    Physical Exam on Discharge:  /53   Pulse 70   Temp 98.3 °F (36.8 °C) (Oral)   Resp 18   Ht 172.7 cm (67.99\")   Wt 88.5 kg (195 lb 1.7 oz)   SpO2 93%   BMI 29.67 kg/m²   Physical Exam   Constitutional:   No distress.  No oxygen use.  No family in room.   HENT:   Head: Normocephalic and atraumatic.   Eyes: Pupils are equal, round, and reactive to light. EOM are normal.   Neck: Normal range of motion. Neck supple.   Cardiovascular: Normal rate, regular rhythm and intact distal pulses. Exam reveals no gallop and no friction rub.   Murmur heard.  Ventricular paced 75 on telemetry   Pulmonary/Chest: Effort normal and breath sounds normal. He has no wheezes. He has no rales.   No oxygen in use.  Lungs clear.   Abdominal: Soft. Bowel sounds are normal. He exhibits no distension. There is no tenderness.   Genitourinary:   Genitourinary Comments: Voiding.   Musculoskeletal: Normal range of motion. He exhibits no edema, tenderness or deformity. "   Neurological:   Answers most questions appropriately.  Gives conflicting answers to some questions.   Skin: Skin is warm and dry. No rash noted. No erythema. No pallor.   Psychiatric:   Flat affect     Condition on Discharge: Stable    Discharge Disposition: Home with family    Discharge Diet:   Diet Instructions     Advance Diet As Tolerated          Activity at Discharge:   Activity Instructions     Activity as Tolerated          Discharge Care Plan / Instructions:   1.  For worsening chronic low back pain seek medical attention.  2.  Follow-up with Dr. Haynes 1 week.  Dr. Frey to follow-up on questionable age-indeterminate minimal mid thoracic vertebral compression deformities on x-rays from [place].  3.  Follow-up with Dr. Elliott 4 weeks.    Discharge Medications:     Discharge Medications      Continue These Medications      Instructions Start Date   acetaminophen 325 MG tablet  Commonly known as:  TYLENOL   650 mg, Oral, Every 6 Hours PRN      ammonium lactate 12 % cream  Commonly known as:  AMLACTIN   1 application, Topical, 2 Times Daily PRN      apixaban 5 MG tablet tablet  Commonly known as:  ELIQUIS   5 mg, Oral, 2 Times Daily      aspirin 81 MG tablet   81 mg, Oral, Daily      budesonide-formoterol 160-4.5 MCG/ACT inhaler  Commonly known as:  SYMBICORT   2 puffs, Inhalation, 2 Times Daily - RT      carvedilol 25 MG tablet  Commonly known as:  COREG   25 mg, Oral, 2 Times Daily With Meals      diphenhydrAMINE 25 mg capsule  Commonly known as:  BENADRYL   25 mg, Oral, Every 6 Hours PRN      fenofibrate 145 MG tablet  Commonly known as:  TRICOR   145 mg, Oral, Daily      folic acid 1 MG tablet  Commonly known as:  FOLVITE   1 mg, Oral, Daily      furosemide 20 MG tablet  Commonly known as:  LASIX   20 mg, Oral, Daily      ipratropium-albuterol  MCG/ACT inhaler  Commonly known as:  COMBIVENT RESPIMAT   1 puff, Inhalation, Every 6 Hours      lisinopril 40 MG tablet  Commonly known as:   PRINIVIL,ZESTRIL   20 mg, Oral, Daily      nitroglycerin 0.4 MG SL tablet  Commonly known as:  NITROSTAT   0.4 mg, Sublingual, Every 5 Minutes PRN, Take no more than 3 doses in 15 minutes.      potassium chloride ER 20 MEQ tablet controlled-release ER tablet  Commonly known as:  K-TAB   10 mEq, Oral, Daily      rosuvastatin 40 MG tablet  Commonly known as:  CRESTOR   20 mg, Oral, Every Evening      Vitamin D3 50 MCG (2000 UT) capsule   2,000 Units, Oral, Daily         Stop These Medications    oxyCODONE-acetaminophen 5-325 MG per tablet  Commonly known as:  PERCOCET          Follow-up Appointments:   Follow-up Information     Alex Haynes MD Follow up.    Specialty:  Family Medicine  Why:  1 week.  Follow-up questionable age indeterminate minimal mid thoracic vertebral compression deformities from [facility] on 6/19/20.  Patient has chronic low back.   pain.    WE WILL CALL WITH AN APPOINTMENT DATE AND TIME  Contact information:  [address] [place] [address]  [phone]             Patrick Elliott MD. Schedule an appointment as soon as possible for a visit in 4 week(s).    Specialty:  Cardiology  Why:  WE WILL CALL WITH AN APPOINTMENT DATE AND TIME  Contact information:  [address]  [place] [address]  [phone]                 Future Appointments:  Future Appointments   Date Time Provider Department Center   7/20/2020 12:15 PM Patrick Elliott MD [facility]       Test Results Pending at Discharge: None    The above documentation resulted from a face-to-face encounter by me Tila RAMOS, [facility].    RACHEL Navarro  06/20/20  13:46    Time: This discharge process required 35 minutes for completion.    Plan discussed with Dr. Jama, Dr. Elliott, and patient.    Time spent in face-to-face evaluation, chart review, planning and education 35 minutes.

## 2020-06-21 ENCOUNTER — READMISSION MANAGEMENT (OUTPATIENT)
Dept: CALL CENTER | Facility: HOSPITAL | Age: 75
End: 2020-06-21

## 2020-06-21 NOTE — OUTREACH NOTE
Prep Survey      Responses   [facility] patient discharged from?  [place]   Is LACE score < 7 ?  No   Eligibility  Readm Mgmt   Discharge diagnosis  **Elevated troponin   Does the patient have one of the following disease processes/diagnoses(primary or secondary)?  Other   Does the patient have Home health ordered?  No   Is there a DME ordered?  No   Prep survey completed?  Yes          Ana María Staples RN

## 2020-06-22 ENCOUNTER — READMISSION MANAGEMENT (OUTPATIENT)
Dept: CALL CENTER | Facility: HOSPITAL | Age: 75
End: 2020-06-22

## 2020-06-22 NOTE — OUTREACH NOTE
Medical Week 1 Survey      Responses   [facility] patient discharged from?  [place]   Does the patient have one of the following disease processes/diagnoses(primary or secondary)?  Other   Is there a successful TCM telephone encounter documented?  No   Week 1 attempt successful?  Yes   Call start time  1552   Call end time  1600   Discharge diagnosis  **Elevated troponin   Is patient permission given to speak with other caregiver?  Yes   List who call center can speak with  Esther- wife    Meds reviewed with patient/caregiver?  Yes   Is the patient having any side effects they believe may be caused by any medication additions or changes?  No   Does the patient have all medications ordered at discharge?  N/A   Is the patient taking all medications as directed (includes completed medication regime)?  Yes   Medication comments  His wife thinks there might be a medication he is missing. According the the AVS none of the medications ordered were new. Advised to have him call [phone] for assistance if needed.    Does the patient have a primary care provider?   Yes   Does the patient have an appointment with their PCP within 7 days of discharge?  Yes   Comments regarding PCP  He has an appt this week with PCP.    Has the patient kept scheduled appointments due by today?  N/A   Has home health visited the patient within 72 hours of discharge?  N/A   Pulse Ox monitoring  None   Psychosocial issues?  No   Did the patient receive a copy of their discharge instructions?  Yes   Nursing interventions  Reviewed instructions with patient   What is the patient's perception of their health status since discharge?  Improving   Is the patient/caregiver able to teach back signs and symptoms related to disease process for when to call 911?  Yes   Is the patient/caregiver able to teach back the hierarchy of who to call/visit for symptoms/problems? PCP, Specialist, Home health nurse, Urgent Care, ED, 911  Yes   Additional teach  back comments  She states he is doing better.   Week 1 call completed?  Yes          Chikis Adan RN

## 2020-06-29 ENCOUNTER — READMISSION MANAGEMENT (OUTPATIENT)
Dept: CALL CENTER | Facility: HOSPITAL | Age: 75
End: 2020-06-29

## 2020-06-29 NOTE — OUTREACH NOTE
Medical Week 2 Survey      Responses   [facility] patient discharged from?  [place]   COVID-19 Test Status  Not tested   Does the patient have one of the following disease processes/diagnoses(primary or secondary)?  Other   Week 2 attempt successful?  No   Unsuccessful attempts  Attempt 1          Vijaya Tamayo RN

## 2020-07-01 ENCOUNTER — READMISSION MANAGEMENT (OUTPATIENT)
Dept: CALL CENTER | Facility: HOSPITAL | Age: 75
End: 2020-07-01

## 2020-07-01 NOTE — OUTREACH NOTE
Medical Week 2 Survey      Responses   [facility] patient discharged from?  [place]   COVID-19 Test Status  Not tested   Does the patient have one of the following disease processes/diagnoses(primary or secondary)?  Other   Week 2 attempt successful?  Yes   Call start time  1011   Call end time  1013   List who call center can speak with  Nicol wife    Meds reviewed with patient/caregiver?  Yes   Is the patient taking all medications as directed (includes completed medication regime)?  Yes   Has the patient kept scheduled appointments due by today?  Yes   Comments  Had Phone Visit yesterday   Pulse Ox monitoring  None   What is the patient's perception of their health status since discharge?  Improving   Week 2 Call Completed?  Yes   Wrap up additional comments  His wife says, he is doing better, no new issues and no questions today, ha phone visit with  yesterday          Susanna Merchant RN

## 2020-07-08 ENCOUNTER — READMISSION MANAGEMENT (OUTPATIENT)
Dept: CALL CENTER | Facility: HOSPITAL | Age: 75
End: 2020-07-08

## 2020-07-08 NOTE — OUTREACH NOTE
Medical Week 3 Survey      Responses   [facility] patient discharged from?  [place]   COVID-19 Test Status  Not tested   Does the patient have one of the following disease processes/diagnoses(primary or secondary)?  Other   Week 3 attempt successful?  No   Unsuccessful attempts  Attempt 1          Chikis Adan RN

## 2020-07-13 ENCOUNTER — READMISSION MANAGEMENT (OUTPATIENT)
Dept: CALL CENTER | Facility: HOSPITAL | Age: 75
End: 2020-07-13

## 2020-07-13 NOTE — OUTREACH NOTE
Medical Week 3 Survey      Responses   [facility] patient discharged from?  [place]   COVID-19 Test Status  Not tested   Does the patient have one of the following disease processes/diagnoses(primary or secondary)?  Other   Week 3 attempt successful?  No   Unsuccessful attempts  Attempt 2          Chikis Adan RN

## 2020-07-14 ENCOUNTER — EPISODE CHANGES (OUTPATIENT)
Dept: CASE MANAGEMENT | Facility: OTHER | Age: 75
End: 2020-07-14

## 2020-07-20 ENCOUNTER — OFFICE VISIT (OUTPATIENT)
Dept: CARDIOLOGY | Facility: CLINIC | Age: 75
End: 2020-07-20

## 2020-07-20 VITALS
SYSTOLIC BLOOD PRESSURE: 110 MMHG | BODY MASS INDEX: 29.86 KG/M2 | WEIGHT: 197 LBS | OXYGEN SATURATION: 100 % | DIASTOLIC BLOOD PRESSURE: 62 MMHG | HEART RATE: 71 BPM | HEIGHT: 68 IN

## 2020-07-20 DIAGNOSIS — M54.50 LUMBAR BACK PAIN: ICD-10-CM

## 2020-07-20 DIAGNOSIS — M54.41 CHRONIC RIGHT-SIDED LOW BACK PAIN WITH RIGHT-SIDED SCIATICA: ICD-10-CM

## 2020-07-20 DIAGNOSIS — G89.29 CHRONIC RIGHT-SIDED LOW BACK PAIN WITH RIGHT-SIDED SCIATICA: ICD-10-CM

## 2020-07-20 DIAGNOSIS — N18.30 CKD (CHRONIC KIDNEY DISEASE), STAGE III (HCC): ICD-10-CM

## 2020-07-20 DIAGNOSIS — I42.8 NON-ISCHEMIC CARDIOMYOPATHY (HCC): ICD-10-CM

## 2020-07-20 DIAGNOSIS — Z79.01 CHRONIC ANTICOAGULATION: ICD-10-CM

## 2020-07-20 DIAGNOSIS — R06.09 DOE (DYSPNEA ON EXERTION): Primary | ICD-10-CM

## 2020-07-20 DIAGNOSIS — E78.2 MIXED HYPERLIPIDEMIA: ICD-10-CM

## 2020-07-20 DIAGNOSIS — Z95.810 AICD (AUTOMATIC CARDIOVERTER/DEFIBRILLATOR) PRESENT: ICD-10-CM

## 2020-07-20 DIAGNOSIS — C43.9 METASTATIC MELANOMA (HCC): ICD-10-CM

## 2020-07-20 PROCEDURE — 93000 ELECTROCARDIOGRAM COMPLETE: CPT | Performed by: INTERNAL MEDICINE

## 2020-07-20 PROCEDURE — 99214 OFFICE O/P EST MOD 30 MIN: CPT | Performed by: INTERNAL MEDICINE

## 2020-07-20 NOTE — PROGRESS NOTES
Wilmar Reyes Jr.  [id number]  1945  74 y.o.  male    Referring Provider: Alex Haynes MD    Reason for Follow-up Visit:  Routine follow up.  Had ICD placed  [facility] monitoring the ICD and will change generator when required  Had myocardial perfusion scan    Cardiac workup test results as below     Subjective    Overall feels well    No new events or complaints since last visit   Overall the patient feels no major change from baseline symptoms   Similar symptoms as during last visit     Tolerating current medications well with no untoward side effects   Compliant with prescribed medication regimen. Tries to adhere to cardiac diet.     Confined indoors due to novel corona virus pandemic gained some weight   Less worried about recurrence of melanoma after follow up in [facility]     No new cardiac events or complaints since last visit   Had shingles left chest wall now recovered     Mild chronic exertional shortness of breath on exertion relieved with rest  No significant cough or wheezing  No palpitations  No associated chest pain  No significant pedal edema    No fever or chills  No significant expectoration    No hemoptysis  No presyncope or syncope       [facility] monitoring device  History of present illness:  Wilmar Reyes Jr. is a 74 y.o. yo male with history of congestive heart failure  who presents today for   Chief Complaint   Patient presents with   • Chest Pain     5 mo f/u - recent echo   .    History  Past Medical History:   Diagnosis Date   • AICD (automatic cardioverter/defibrillator) present    • CAD (coronary artery disease)    • CAD in native artery     7/2015- ECHO: EF 50-55% (increased from 2/2013- 25%), abnormal LV diastolic function. 8/2015- LexiScan: negative for ischemia/scar 2/2013- Cath: Mild CAD, non-ischemic cardiomyopathy.   • Cancer (CMS/HCC)     melamona   • Chest pain, unspecified    • Chronic combined systolic and diastolic congestive heart failure (CMS/HCC)      7/2015- ECHO: EF  50-55% (increased from 2/2013- 25%), abnormal LV diastolic function.as    • COPD (chronic obstructive pulmonary disease) (CMS/HCC)    • FH: chemotherapy    • HLD (hyperlipidemia)    • Hx of scabies    • Hyperlipidemia, mixed    • Hypertension    • Hypertension, benign    • Immune deficiency disorder (CMS/HCC)    • Left bundle branch block    • Myocardial infarction (CMS/HCC)    • Non-ischemic cardiomyopathy (CMS/HCC)     LifeVest   • Status post implantation of automatic cardioverter/defibrillator (AICD)      Placed 4/2013. Interrogated by [facility].   ,   Past Surgical History:   Procedure Laterality Date   • A-V CARDIAC PACEMAKER INSERTION     • CARDIAC DEFIBRILLATOR PLACEMENT Left 02/05/2013    Sev dil nonischemic cardiomyopathy. Mild CAD.   • CORONARY ANGIOPLASTY     • LYMPH NODE BIOPSY      left leg lymp node bx 2/2016   • OTHER SURGICAL HISTORY  04/29/2013    BS BiV ICD Implantation   • SKIN CANCER EXCISION     ,   Family History   Problem Relation Age of Onset   • Heart disease Mother    • Heart disease Father    ,   Social History     Tobacco Use   • Smoking status: Former Smoker     Years: 32.00     Types: Cigarettes   • Smokeless tobacco: Never Used   Substance Use Topics   • Alcohol use: No   • Drug use: No   ,     Medications  Current Outpatient Medications   Medication Sig Dispense Refill   • acetaminophen (TYLENOL) 325 MG tablet Take 650 mg by mouth Every 6 (Six) Hours As Needed for Mild Pain (1-3).     • ammonium lactate (AMLACTIN) 12 % cream Apply 1 application topically to the appropriate area as directed 2 (Two) Times a Day As Needed for Dry Skin.     • apixaban (ELIQUIS) 5 MG tablet tablet Take 1 tablet by mouth 2 (Two) Times a Day. 60 tablet 11   • aspirin 81 MG tablet Take 81 mg by mouth Daily.     • budesonide-formoterol (SYMBICORT) 160-4.5 MCG/ACT inhaler Inhale 2 puffs 2 (Two) Times a Day.     • carvedilol (COREG) 25 MG tablet Take 25 mg by mouth 2 (Two) Times a Day With Meals.     •  Cholecalciferol (VITAMIN D3) 50 MCG (2000 UT) capsule Take 2,000 Units by mouth Daily.     • diphenhydrAMINE (BENADRYL) 25 mg capsule Take 25 mg by mouth Every 6 (Six) Hours As Needed for Itching.     • fenofibrate (TRICOR) 145 MG tablet Take 145 mg by mouth Daily.     • folic acid (FOLVITE) 1 MG tablet Take 1 mg by mouth Daily.     • furosemide (LASIX) 20 MG tablet Take 20 mg by mouth Daily.     • ipratropium-albuterol (COMBIVENT RESPIMAT)  MCG/ACT inhaler Inhale 1 puff Every 6 (Six) Hours.     • lisinopril (PRINIVIL,ZESTRIL) 40 MG tablet Take 20 mg by mouth Daily.     • nitroglycerin (NITROSTAT) 0.4 MG SL tablet Place 0.4 mg under the tongue Every 5 (Five) Minutes As Needed for chest pain. Take no more than 3 doses in 15 minutes.     • potassium chloride ER (K-TAB) 20 MEQ tablet controlled-release ER tablet Take 10 mEq by mouth Daily.     • rosuvastatin (CRESTOR) 40 MG tablet Take 20 mg by mouth Every Evening.       No current facility-administered medications for this visit.        Allergies:  Patient has no known allergies.    Review of Systems  Review of Systems   Constitution: Positive for malaise/fatigue. Negative for chills, decreased appetite, fever, weight gain and weight loss.   HENT: Negative.  Negative for nosebleeds.    Eyes: Negative.  Negative for visual disturbance.   Cardiovascular: Positive for dyspnea on exertion. Negative for chest pain, claudication, cyanosis, irregular heartbeat, leg swelling, near-syncope, orthopnea, palpitations, paroxysmal nocturnal dyspnea and syncope.   Respiratory: Positive for shortness of breath. Negative for cough, hemoptysis and snoring.    Endocrine: Negative.  Negative for cold intolerance and heat intolerance.   Hematologic/Lymphatic: Negative.  Negative for bleeding problem. Does not bruise/bleed easily.   Skin: Negative.  Negative for rash.   Musculoskeletal: Positive for arthritis, back pain and stiffness. Negative for falls.   Gastrointestinal: Negative  "for abdominal pain, anorexia, constipation, diarrhea, heartburn, melena, nausea and vomiting.   Genitourinary: Negative.  Negative for hematuria.   Neurological: Positive for weakness. Negative for dizziness, headaches and light-headedness.   Psychiatric/Behavioral: Negative.  Negative for altered mental status.   Allergic/Immunologic: Negative for persistent infections.       Objective     Physical Exam:  /62   Pulse 71   Ht 172.7 cm (67.99\")   Wt 89.4 kg (197 lb)   SpO2 100%   BMI 29.96 kg/m²   Physical Exam   Constitutional: He appears well-developed.   HENT:   Head: Normocephalic.   Neck: Normal carotid pulses and no JVD present. No tracheal tenderness present. Carotid bruit is not present. No tracheal deviation and no edema present.   Cardiovascular: Regular rhythm and normal pulses.   Murmur heard.   Systolic murmur is present with a grade of 2/6.  Pulmonary/Chest: Effort normal. No stridor.   Abdominal: Soft. He exhibits no distension. There is no hepatosplenomegaly. There is no tenderness.   Neurological: He is alert. He has normal strength. No cranial nerve deficit or sensory deficit.   Skin: Skin is warm.   Psychiatric: He has a normal mood and affect. His speech is normal and behavior is normal.       Results Review:    Results for orders placed during the hospital encounter of 02/27/20   Adult Transthoracic Echo Complete W/ Cont if Necessary Per Protocol    Narrative · Left ventricular wall thickness is consistent with mild concentric   hypertrophy.  · Estimated EF = 55%.  · Left ventricular systolic function is normal.  · Left ventricular diastolic dysfunction.  · No evidence of pulmonary hypertension is present.      myocardial perfusion scan      · Left ventricular ejection fraction is borderline normal (Calculated EF = 49%).  · Myocardial perfusion imaging indicates a small-sized area of ischemia located in the apex.  · Diaphragmatic attenuation artifact is present.  · Raw images reviewed " with the following abnormalities noted: vertical motion artifact.  · Impressions are consistent with a low risk study.       ECG 12 Lead  Date/Time: 7/20/2020 12:49 PM  Performed by: Patrick Elliott MD  Authorized by: Patrick Elliott MD   Comparison: compared with previous ECG from 6/19/2019  Comparison to previous ECG: AV paced premature ventricular contractions   Rhythm: paced  Ectopy: unifocal PVCs    Clinical impression: abnormal EKG            Assessment/Plan   Patient Active Problem List   Diagnosis   • Metastatic melanoma (CMS/HCC)   • HLD (hyperlipidemia)   • Non-ischemic cardiomyopathy (CMS/HCC)   • AICD (automatic cardioverter/defibrillator) present   • Chronic right-sided low back pain with right-sided sciatica   • CUEVAS (dyspnea on exertion)   • Hx of scabies     .____________________________________________________________________________________________________________________________________________  Health maintenance and recommendations    Similar recommendations as last visit       Offered to give patient  a copy      Questions were encouraged, asked and answered to the patient's  understanding and satisfaction. Questions if any regarding current medications and side effects, need for refills and importance of compliance to medications stressed.    Reviewed available prior notes, consults, prior visits, laboratory findings, radiology and cardiology relevant reports. Updated chart as applicable. I have reviewed the patient's medical history in detail and updated the computerized patient record as relevant.      Updated patient regarding any new or relevant abnormalities on review of records or any new findings on physical exam. Mentioned to patient about purpose of visit and desirable health short and long term goals and objectives.    Primary to monitor CBC CMP Lipid panel and TSH as  applicable    ___________________________________________________________________________________________________________________________________________     Plan      Discussed results of prior testing with patient  Patient expressed understanding  Encouraged and answered all questions   Discussed with the patient and all questioned fully answered. He will call me if any problems arise.      Keep LDL below 70 mg/dl. Monitor liver and renal functions.   Monitor CBC, CMP, TSH (as indicated) and Lipid Panel by primary     S/L NTG PRN for chest pain, call me or go to ER if has to use S/L nitroglycerin     Monitor cardiac rhythm device function regularly per established schedule or PRN    This being done at [facility]     The current medical regimen is effective;  continue present plan and medications.         Return in about 6 months (around 1/20/2021).

## 2020-12-30 ENCOUNTER — TELEPHONE (OUTPATIENT)
Dept: GASTROENTEROLOGY | Facility: CLINIC | Age: 75
End: 2020-12-30

## 2021-01-25 NOTE — PROGRESS NOTES
Chief Complaint   Patient presents with   • Colon Cancer Screening     Pt presents today for colon recall-last colon was 10/7/2015; Pesonal history of hyperplastic polyps     Subjective   HPI  Wilmar Reyes Jr. is a 75 y.o. male who presents as a referral for preventative maintenance. He has no complaints of nausea or vomiting. No change in bowels. No wt loss. No BRBPR. No melena. There is no family hx for colon cancer. No abdominal pain. There was no Cologuard screening this year.  The patient's last colonoscopy failed hyperplastic polyp in the rectum performed on 10/7/2015.  Past Medical History:   Diagnosis Date   • AICD (automatic cardioverter/defibrillator) present    • CAD (coronary artery disease)     7/2015- ECHO: EF 50-55% (increased from 2/2013- 25%), abnormal LV diastolic function. 8/2015- LexiScan: negative for ischemia/scar 2/2013- Cath: Mild CAD, non-ischemic cardiomyopathy.   • Chest pain, unspecified    • Chronic combined systolic and diastolic congestive heart failure (CMS/HCC)      7/2015- ECHO: EF 50-55% (increased from 2/2013- 25%), abnormal LV diastolic function.as    • COPD (chronic obstructive pulmonary disease) (CMS/HCC)    • FH: chemotherapy    • History of melanoma    • HLD (hyperlipidemia)    • Hx of scabies    • Hyperlipidemia, mixed    • Hypertension    • Immune deficiency disorder (CMS/HCC)    • Left bundle branch block    • Myocardial infarction (CMS/HCC)    • Non-ischemic cardiomyopathy (CMS/HCC)     LifeVest   • Status post implantation of automatic cardioverter/defibrillator (AICD)      Placed 4/2013. Interrogated by [facility].     Past Surgical History:   Procedure Laterality Date   • A-V CARDIAC PACEMAKER INSERTION     • CARDIAC DEFIBRILLATOR PLACEMENT Left 02/05/2013    Sev dil nonischemic cardiomyopathy. Mild CAD.   • COLONOSCOPY  10/07/2015    One 4mm hyperplastic polyp in the rectum; The examination was otherwise normal on direct and retroflexion views; Repeat 5 years   •  CORONARY ANGIOPLASTY     • LYMPH NODE BIOPSY      left leg lymp node bx 2/2016   • OTHER SURGICAL HISTORY  04/29/2013    BS BiV ICD Implantation   • SKIN CANCER EXCISION         Current Outpatient Medications:   •  acetaminophen (TYLENOL) 325 MG tablet, Take 650 mg by mouth Every 6 (Six) Hours As Needed for Mild Pain (1-3)., Disp: , Rfl:   •  ammonium lactate (AMLACTIN) 12 % cream, Apply 1 application topically to the appropriate area as directed 2 (Two) Times a Day As Needed for Dry Skin., Disp: , Rfl:   •  apixaban (ELIQUIS) 5 MG tablet tablet, Take 1 tablet by mouth 2 (Two) Times a Day., Disp: 60 tablet, Rfl: 11  •  aspirin 81 MG tablet, Take 81 mg by mouth Daily., Disp: , Rfl:   •  budesonide-formoterol (SYMBICORT) 160-4.5 MCG/ACT inhaler, Inhale 2 puffs 2 (Two) Times a Day., Disp: , Rfl:   •  carvedilol (COREG) 25 MG tablet, Take 25 mg by mouth 2 (Two) Times a Day With Meals., Disp: , Rfl:   •  Cholecalciferol (VITAMIN D3) 50 MCG (2000 UT) capsule, Take 2,000 Units by mouth Daily., Disp: , Rfl:   •  diphenhydrAMINE (BENADRYL) 25 mg capsule, Take 25 mg by mouth Every 6 (Six) Hours As Needed for Itching., Disp: , Rfl:   •  fenofibrate (TRICOR) 145 MG tablet, Take 145 mg by mouth Daily., Disp: , Rfl:   •  folic acid (FOLVITE) 1 MG tablet, Take 1 mg by mouth Daily., Disp: , Rfl:   •  furosemide (LASIX) 20 MG tablet, Take 20 mg by mouth Daily., Disp: , Rfl:   •  ipratropium-albuterol (COMBIVENT RESPIMAT)  MCG/ACT inhaler, Inhale 1 puff Every 6 (Six) Hours., Disp: , Rfl:   •  lisinopril (PRINIVIL,ZESTRIL) 40 MG tablet, Take 20 mg by mouth Daily., Disp: , Rfl:   •  nitroglycerin (NITROSTAT) 0.4 MG SL tablet, Place 0.4 mg under the tongue Every 5 (Five) Minutes As Needed for chest pain. Take no more than 3 doses in 15 minutes., Disp: , Rfl:   •  oxyCODONE-acetaminophen (PERCOCET) 5-325 MG per tablet, Take 1 tablet by mouth As Needed., Disp: , Rfl:   •  potassium chloride ER (K-TAB) 20 MEQ tablet  "controlled-release ER tablet, Take 10 mEq by mouth Daily., Disp: , Rfl:   •  rosuvastatin (CRESTOR) 40 MG tablet, Take 20 mg by mouth Every Evening., Disp: , Rfl:   •  polyethylene glycol (Golytely) 236 g solution, Take 4,000 mL by mouth 1 (One) Time for 1 dose. As directed by instructions provided at office, Disp: 4000 mL, Rfl: 0  No Known Allergies  Social History     Socioeconomic History   • Marital status:      Spouse name: Not on file   • Number of children: Not on file   • Years of education: Not on file   • Highest education level: Not on file   Tobacco Use   • Smoking status: Former Smoker     Years: 32.00     Types: Cigarettes   • Smokeless tobacco: Never Used   Substance and Sexual Activity   • Alcohol use: Not Currently   • Drug use: No   • Sexual activity: Defer     Family History   Problem Relation Age of Onset   • Heart disease Mother    • Heart disease Father    • Colon cancer Neg Hx    • Colon polyps Neg Hx    • Esophageal cancer Neg Hx    • Liver cancer Neg Hx    • Liver disease Neg Hx    • Stomach cancer Neg Hx    • Rectal cancer Neg Hx        REVIEW OF SYSTEMS  General: well appearing, no fever chills or sweats, no unexplained wt loss  HEENT: no acute visual or hearing disturbances  Cardiovascular: No chest pain or palpitations  Pulmonary: No shortness of breath, coughing, wheezing or hemoptysis  : No burning, urgency, hematuria, or dysuria  Musculoskeletal: No joint pain or stiffness  Peripheral: no edema  Skin: No lesions or rashes  Neuro: No dizziness, headaches, stroke, syncope  Endocrine: No hot or cold intolerances  Hematological: No blood dyscrasias    Objective   Vitals:    01/26/21 0901   BP: 138/70   BP Location: Left arm   Patient Position: Sitting   Cuff Size: Adult   Pulse: 69   Temp: 97.8 °F (36.6 °C)   TempSrc: Infrared   SpO2: 98%   Weight: 88.5 kg (195 lb)   Height: 172.7 cm (68\")     Body mass index is 29.65 kg/m².    PHYSICAL EXAM  General: age appropriate well " nourished well appearing, no acute distress  Head: normocephalic and atraumatic  Global assessment-supple  Neck-No JVD noted, no lymphadenopathy  Pulmonary-clear to auscultation bilaterally, normal respiratory effort  Cardiovascular-normal rate and rhythm, normal heart sounds, S1 and S2 noted  Abdomen-soft, non tender, non distended, normal bowel sounds all 4 quadrants, no hepatosplenomegaly noted  Extremities-No clubbing cyanosis or edema  Neuro-Non focal, converses appropriately, awake, alert, oriented    Lab Results - Last 18 Months   Lab Units 06/20/20  0334 06/19/20  1311   GLUCOSE mg/dL 101* 114*   BUN mg/dL 25* 22   CREATININE mg/dL 1.30* 1.59*   SODIUM mmol/L 141 142   POTASSIUM mmol/L 3.4* 3.6   CHLORIDE mmol/L 108* 109*   CO2 mmol/L 19.0* 19.0*       Lab Results - Last 18 Months   Lab Units 06/20/20  0334   HEMOGLOBIN g/dL 11.9*   HEMATOCRIT % 34.8*   MCV fL 87.2   WBC 10*3/mm3 5.91   RDW % 13.9   MPV fL 9.8   PLATELETS 10*3/mm3 102*           Imaging Results (Most Recent)     None        Assessment/Plan   Diagnoses and all orders for this visit:    1. Hx of colonic polyps (Primary)  -     Case Request; Standing  -     Case Request    2. Chronic anticoagulation  Comments:  On Eliquis will need permission to hold 2 days    Other orders  -     Follow Anesthesia Guidelines / Protocol; Future  -     Obtain Informed Consent; Future  -     Implement Anesthesia Orders Day of Procedure; Standing  -     Obtain Informed Consent; Standing  -     Verify bowel prep was successful; Standing  -     polyethylene glycol (Golytely) 236 g solution; Take 4,000 mL by mouth 1 (One) Time for 1 dose. As directed by instructions provided at office  Dispense: 4000 mL; Refill: 0      COLONOSCOPY WITH ANESTHESIA (N/A)       Body mass index is 29.65 kg/m². Patient's Body mass index is 29.65 kg/m². BMI is within normal parameters. No follow-up required..      All risks, benefits, alternatives, and indications of colonoscopy procedure  have been discussed with the patient. Risks to include perforation of the colon requiring possible surgery or colostomy, risk of bleeding from biopsies or removal of colon tissue, possibility of missing a colon polyp or cancer, or adverse drug reaction.  Benefits to include the diagnosis and management of disease of the colon and rectum. Alternatives to include barium enema, radiographic evaluation, lab testing or no intervention. Pt verbalizes understanding and agrees.

## 2021-01-26 ENCOUNTER — OFFICE VISIT (OUTPATIENT)
Dept: GASTROENTEROLOGY | Facility: CLINIC | Age: 76
End: 2021-01-26

## 2021-01-26 VITALS
SYSTOLIC BLOOD PRESSURE: 138 MMHG | HEIGHT: 68 IN | BODY MASS INDEX: 29.55 KG/M2 | DIASTOLIC BLOOD PRESSURE: 70 MMHG | OXYGEN SATURATION: 98 % | HEART RATE: 69 BPM | TEMPERATURE: 97.8 F | WEIGHT: 195 LBS

## 2021-01-26 DIAGNOSIS — Z79.01 CHRONIC ANTICOAGULATION: ICD-10-CM

## 2021-01-26 DIAGNOSIS — Z86.010 HX OF COLONIC POLYPS: Primary | ICD-10-CM

## 2021-01-26 PROCEDURE — 99212 OFFICE O/P EST SF 10 MIN: CPT | Performed by: NURSE PRACTITIONER

## 2021-01-26 RX ORDER — POLYETHYLENE GLYCOL 3350, SODIUM SULFATE ANHYDROUS, SODIUM BICARBONATE, SODIUM CHLORIDE, POTASSIUM CHLORIDE 236; 22.74; 6.74; 5.86; 2.97 G/4L; G/4L; G/4L; G/4L; G/4L
4 POWDER, FOR SOLUTION ORAL ONCE
Qty: 4000 ML | Refills: 0 | Status: SHIPPED | OUTPATIENT
Start: 2021-01-26 | End: 2021-01-26

## 2021-01-26 RX ORDER — OXYCODONE HYDROCHLORIDE AND ACETAMINOPHEN 5; 325 MG/1; MG/1
1 TABLET ORAL AS NEEDED
COMMUNITY

## 2021-01-27 PROBLEM — Z86.010 HX OF COLONIC POLYPS: Status: ACTIVE | Noted: 2021-01-27

## 2021-02-02 ENCOUNTER — OFFICE VISIT (OUTPATIENT)
Dept: CARDIOLOGY | Facility: CLINIC | Age: 76
End: 2021-02-02

## 2021-02-02 ENCOUNTER — TELEPHONE (OUTPATIENT)
Dept: GASTROENTEROLOGY | Facility: CLINIC | Age: 76
End: 2021-02-02

## 2021-02-02 VITALS
OXYGEN SATURATION: 97 % | HEART RATE: 76 BPM | DIASTOLIC BLOOD PRESSURE: 58 MMHG | SYSTOLIC BLOOD PRESSURE: 108 MMHG | BODY MASS INDEX: 30.25 KG/M2 | WEIGHT: 199.6 LBS | HEIGHT: 68 IN

## 2021-02-02 DIAGNOSIS — I42.8 NON-ISCHEMIC CARDIOMYOPATHY (HCC): ICD-10-CM

## 2021-02-02 DIAGNOSIS — C43.9 METASTATIC MELANOMA (HCC): ICD-10-CM

## 2021-02-02 DIAGNOSIS — E78.2 MIXED HYPERLIPIDEMIA: ICD-10-CM

## 2021-02-02 DIAGNOSIS — M54.41 CHRONIC RIGHT-SIDED LOW BACK PAIN WITH RIGHT-SIDED SCIATICA: ICD-10-CM

## 2021-02-02 DIAGNOSIS — Z86.19 HX OF SCABIES: ICD-10-CM

## 2021-02-02 DIAGNOSIS — R06.09 DOE (DYSPNEA ON EXERTION): ICD-10-CM

## 2021-02-02 DIAGNOSIS — N18.30 STAGE 3 CHRONIC KIDNEY DISEASE, UNSPECIFIED WHETHER STAGE 3A OR 3B CKD (HCC): Primary | ICD-10-CM

## 2021-02-02 DIAGNOSIS — Z95.810 AICD (AUTOMATIC CARDIOVERTER/DEFIBRILLATOR) PRESENT: ICD-10-CM

## 2021-02-02 DIAGNOSIS — G89.29 CHRONIC RIGHT-SIDED LOW BACK PAIN WITH RIGHT-SIDED SCIATICA: ICD-10-CM

## 2021-02-02 PROCEDURE — 99213 OFFICE O/P EST LOW 20 MIN: CPT | Performed by: INTERNAL MEDICINE

## 2021-02-02 PROCEDURE — 93000 ELECTROCARDIOGRAM COMPLETE: CPT | Performed by: INTERNAL MEDICINE

## 2021-02-02 NOTE — TELEPHONE ENCOUNTER
Received clearance for pt to hold Eliquis for 2 day prior to procedure. PT is scheduled for 2/12/21 and last dose of Eliquis is 2/9/21. I called pt and lvm explaining this and left our call back number if the pt had any questions.

## 2021-02-02 NOTE — TELEPHONE ENCOUNTER
----- Message from Patrick Elliott MD sent at 1/30/2021  6:49 PM CST -----  Regarding: Holding Eliquis  Acceptable cardiovascular risk of planned procedure.  Can proceed with surgery with usual caution and perioperative hemodynamic and cardiac rhythm monitoring.    OK to hold Eliquis x 2 days prior to procedure and resume when possible when no bleeding risks after procedure.      Patient and the doctor performing the procedure and requesting holding anticoagulation to fully understand that can get a stroke or other forms of cardioembolism when the patient  is off anticoagulation but willing to assume this risk       Implicit per requesting provider that the need and benefit of the recommended procedure outweighs the risk of anticoagulation cessation    No need for Lovenox      ----- Message -----  From: Maddi Archer MA  Sent: 1/26/2021   9:30 AM CST  To: Patrick Elliott MD

## 2021-02-02 NOTE — PROGRESS NOTES
Wilmar Reyes Jr.  [id number]  1945  75 y.o.  male    Referring Provider: Alex Haynes MD    Reason for Follow-up Visit:  Routine follow up.  Had ICD placed  [facility] monitoring the ICD and will change generator when required  Had myocardial perfusion scan    Cardiac workup test results as below : echo 2020  Subjective    Mild chronic exertional shortness of breath on exertion relieved with rest  No significant cough or wheezing    No palpitations  No associated chest pain  No significant pedal edema    No fever or chills  No significant expectoration    No hemoptysis  No presyncope or syncope    Tolerating current medications well with no untoward side effects   Compliant with prescribed medication regimen. Tries to adhere to cardiac diet.     No new events or complaints since last visit     Overall the patient feels no major change from baseline symptoms   No bleeding, excessive bruising, gait instability or fall risks    [facility] monitoring device  History of present illness:  Wilmar Reyes Jr. is a 75 y.o. yo male with history of congestive heart failure  who presents today for   Chief Complaint   Patient presents with   • CUEVAS     6mo- no issues at this time.    .    History  Past Medical History:   Diagnosis Date   • AICD (automatic cardioverter/defibrillator) present    • CAD (coronary artery disease)     7/2015- ECHO: EF 50-55% (increased from 2/2013- 25%), abnormal LV diastolic function. 8/2015- LexiScan: negative for ischemia/scar 2/2013- Cath: Mild CAD, non-ischemic cardiomyopathy.   • Chest pain, unspecified    • Chronic combined systolic and diastolic congestive heart failure (CMS/HCC)      7/2015- ECHO: EF 50-55% (increased from 2/2013- 25%), abnormal LV diastolic function.as    • COPD (chronic obstructive pulmonary disease) (CMS/HCC)    • FH: chemotherapy    • History of melanoma    • HLD (hyperlipidemia)    • Hx of scabies    • Hyperlipidemia, mixed    • Hypertension    • Immune deficiency disorder  (CMS/HCC)    • Left bundle branch block    • Myocardial infarction (CMS/HCC)    • Non-ischemic cardiomyopathy (CMS/HCC)     LifeVest   • Status post implantation of automatic cardioverter/defibrillator (AICD)      Placed 4/2013. Interrogated by [facility].   ,   Past Surgical History:   Procedure Laterality Date   • A-V CARDIAC PACEMAKER INSERTION     • CARDIAC DEFIBRILLATOR PLACEMENT Left 02/05/2013    Sev dil nonischemic cardiomyopathy. Mild CAD.   • COLONOSCOPY  10/07/2015    One 4mm hyperplastic polyp in the rectum; The examination was otherwise normal on direct and retroflexion views; Repeat 5 years   • CORONARY ANGIOPLASTY     • LYMPH NODE BIOPSY      left leg lymp node bx 2/2016   • OTHER SURGICAL HISTORY  04/29/2013    BS BiV ICD Implantation   • SKIN CANCER EXCISION     ,   Family History   Problem Relation Age of Onset   • Heart disease Mother    • Heart disease Father    • Colon cancer Neg Hx    • Colon polyps Neg Hx    • Esophageal cancer Neg Hx    • Liver cancer Neg Hx    • Liver disease Neg Hx    • Stomach cancer Neg Hx    • Rectal cancer Neg Hx    ,   Social History     Tobacco Use   • Smoking status: Former Smoker     Years: 32.00     Types: Cigarettes   • Smokeless tobacco: Never Used   Substance Use Topics   • Alcohol use: Not Currently   • Drug use: No   ,     Medications  Current Outpatient Medications   Medication Sig Dispense Refill   • acetaminophen (TYLENOL) 325 MG tablet Take 650 mg by mouth Every 6 (Six) Hours As Needed for Mild Pain (1-3).     • ammonium lactate (AMLACTIN) 12 % cream Apply 1 application topically to the appropriate area as directed 2 (Two) Times a Day As Needed for Dry Skin.     • apixaban (ELIQUIS) 5 MG tablet tablet Take 1 tablet by mouth 2 (Two) Times a Day. 60 tablet 11   • aspirin 81 MG tablet Take 81 mg by mouth Daily.     • budesonide-formoterol (SYMBICORT) 160-4.5 MCG/ACT inhaler Inhale 2 puffs 2 (Two) Times a Day.     • carvedilol (COREG) 25 MG tablet Take 25  mg by mouth 2 (Two) Times a Day With Meals.     • Cholecalciferol (VITAMIN D3) 50 MCG (2000 UT) capsule Take 2,000 Units by mouth Daily.     • diphenhydrAMINE (BENADRYL) 25 mg capsule Take 25 mg by mouth Every 6 (Six) Hours As Needed for Itching.     • fenofibrate (TRICOR) 145 MG tablet Take 145 mg by mouth Daily.     • folic acid (FOLVITE) 1 MG tablet Take 1 mg by mouth Daily.     • furosemide (LASIX) 20 MG tablet Take 20 mg by mouth Daily.     • ipratropium-albuterol (COMBIVENT RESPIMAT)  MCG/ACT inhaler Inhale 1 puff Every 6 (Six) Hours.     • lisinopril (PRINIVIL,ZESTRIL) 40 MG tablet Take 20 mg by mouth Daily.     • nitroglycerin (NITROSTAT) 0.4 MG SL tablet Place 0.4 mg under the tongue Every 5 (Five) Minutes As Needed for chest pain. Take no more than 3 doses in 15 minutes.     • oxyCODONE-acetaminophen (PERCOCET) 5-325 MG per tablet Take 1 tablet by mouth As Needed.     • potassium chloride ER (K-TAB) 20 MEQ tablet controlled-release ER tablet Take 10 mEq by mouth Daily.     • rosuvastatin (CRESTOR) 40 MG tablet Take 20 mg by mouth Every Evening.       No current facility-administered medications for this visit.        Allergies:  Patient has no known allergies.    Review of Systems  Review of Systems   Constitution: Positive for malaise/fatigue. Negative for chills, decreased appetite, fever, weight gain and weight loss.   HENT: Negative.  Negative for nosebleeds.    Eyes: Negative.  Negative for visual disturbance.   Cardiovascular: Positive for dyspnea on exertion. Negative for chest pain, claudication, cyanosis, irregular heartbeat, leg swelling, near-syncope, orthopnea, palpitations, paroxysmal nocturnal dyspnea and syncope.   Respiratory: Positive for shortness of breath. Negative for cough, hemoptysis and snoring.    Endocrine: Negative.  Negative for cold intolerance and heat intolerance.   Hematologic/Lymphatic: Negative.  Negative for bleeding problem. Does not bruise/bleed easily.   Skin:  "Negative.  Negative for rash.   Musculoskeletal: Positive for arthritis, back pain and stiffness. Negative for falls.   Gastrointestinal: Negative for abdominal pain, anorexia, constipation, diarrhea, heartburn, melena, nausea and vomiting.   Genitourinary: Negative.  Negative for hematuria.   Neurological: Positive for weakness. Negative for dizziness, headaches and light-headedness.   Psychiatric/Behavioral: Negative.  Negative for altered mental status.   Allergic/Immunologic: Negative for persistent infections.   Same review of system and physical exam as last visit        Objective     Physical Exam:  /58   Pulse 76   Ht 172.7 cm (68\")   Wt 90.5 kg (199 lb 9.6 oz)   SpO2 97%   BMI 30.35 kg/m²   Physical Exam   Constitutional: He appears well-developed.   HENT:   Head: Normocephalic.   Neck: Normal carotid pulses and no JVD present. No tracheal tenderness present. Carotid bruit is not present. No tracheal deviation and no edema present.   Cardiovascular: Regular rhythm and normal pulses.   Murmur heard.   Systolic murmur is present with a grade of 2/6.  Pulmonary/Chest: Effort normal. No stridor.   Abdominal: Soft. He exhibits no distension. There is no abdominal tenderness.   Neurological: He is alert. No cranial nerve deficit or sensory deficit.   Skin: Skin is warm.   Psychiatric: His speech is normal and behavior is normal.       Results Review:    Results for orders placed during the hospital encounter of 02/27/20   Adult Transthoracic Echo Complete W/ Cont if Necessary Per Protocol    Narrative · Left ventricular wall thickness is consistent with mild concentric   hypertrophy.  · Estimated EF = 55%.  · Left ventricular systolic function is normal.  · Left ventricular diastolic dysfunction.  · No evidence of pulmonary hypertension is present.      myocardial perfusion scan      · Left ventricular ejection fraction is borderline normal (Calculated EF = 49%).  · Myocardial perfusion imaging " indicates a small-sized area of ischemia located in the apex.  · Diaphragmatic attenuation artifact is present.  · Raw images reviewed with the following abnormalities noted: vertical motion artifact.  · Impressions are consistent with a low risk study.       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     Monitor for any signs of bleeding including red or dark stools as well as easy bruisabilty. Fall precautions.      Monitor cardiac rhythm device function regularly per established schedule or PRN    Check BP and heart rates twice daily at least 3x / week, week a month  at home and bring a recording for me to review next visit  If BP >130/85 or < 100/60 persistently over 3 reading 30 mins apart call sooner      I support the patient's decision to take the Covid -19 vaccine       ____________________________________________________________________________________________________________________________________________  Health maintenance and recommendations  Similar recommendations as last visit     Offered to give patient  a copy      Questions were encouraged, asked and answered to the patient's  understanding and satisfaction.  Questions if any regarding current medications and side effects, need for refills and importance of compliance to medications stressed.    Reviewed available prior notes, consults, prior visits, laboratory findings, radiology and cardiology relevant reports. Updated chart as applicable. I have reviewed the patient's medical history in detail and updated the computerized patient record as relevant.      Updated patient regarding any new or relevant abnormalities on review of records or any new findings on physical exam. Mentioned to patient about purpose of visit and desirable health short and long term goals and objectives.    Primary to monitor CBC CMP Lipid panel and TSH as applicable    ___________________________________________________________________________________________________________________________________________      Follow up with RACHEL Baumann to address interim issues           Return in about 6 months (around 8/2/2021).

## 2021-02-08 ENCOUNTER — TRANSCRIBE ORDERS (OUTPATIENT)
Dept: LAB | Facility: HOSPITAL | Age: 76
End: 2021-02-08

## 2021-02-08 DIAGNOSIS — Z01.818 PREOP TESTING: Primary | ICD-10-CM

## 2021-02-09 ENCOUNTER — LAB (OUTPATIENT)
Dept: LAB | Facility: HOSPITAL | Age: 76
End: 2021-02-09

## 2021-02-09 LAB — SARS-COV-2 ORF1AB RESP QL NAA+PROBE: NOT DETECTED

## 2021-02-09 PROCEDURE — U0004 COV-19 TEST NON-CDC HGH THRU: HCPCS | Performed by: INTERNAL MEDICINE

## 2021-02-09 PROCEDURE — C9803 HOPD COVID-19 SPEC COLLECT: HCPCS | Performed by: INTERNAL MEDICINE

## 2021-02-19 ENCOUNTER — TELEPHONE (OUTPATIENT)
Dept: GASTROENTEROLOGY | Facility: CLINIC | Age: 76
End: 2021-02-19

## 2021-02-19 NOTE — TELEPHONE ENCOUNTER
I have tried calling pt multiple times to reschedule his procedure with no answer so I lvm. Recall set for 2 months.

## 2021-03-17 ENCOUNTER — TRANSCRIBE ORDERS (OUTPATIENT)
Dept: LAB | Facility: HOSPITAL | Age: 76
End: 2021-03-17

## 2021-03-17 DIAGNOSIS — Z01.818 PRE-OP TESTING: Primary | ICD-10-CM

## 2021-03-19 ENCOUNTER — TRANSCRIBE ORDERS (OUTPATIENT)
Dept: LAB | Facility: HOSPITAL | Age: 76
End: 2021-03-19

## 2021-03-19 DIAGNOSIS — Z01.818 PREOPERATIVE TESTING: Primary | ICD-10-CM

## 2021-03-22 ENCOUNTER — LAB (OUTPATIENT)
Dept: LAB | Facility: HOSPITAL | Age: 76
End: 2021-03-22

## 2021-03-22 DIAGNOSIS — Z01.818 PREOPERATIVE TESTING: ICD-10-CM

## 2021-03-22 LAB — SARS-COV-2 ORF1AB RESP QL NAA+PROBE: NOT DETECTED

## 2021-03-22 PROCEDURE — U0004 COV-19 TEST NON-CDC HGH THRU: HCPCS | Performed by: INTERNAL MEDICINE

## 2021-03-22 PROCEDURE — C9803 HOPD COVID-19 SPEC COLLECT: HCPCS | Performed by: INTERNAL MEDICINE

## 2021-04-09 ENCOUNTER — TRANSCRIBE ORDERS (OUTPATIENT)
Dept: LAB | Facility: HOSPITAL | Age: 76
End: 2021-04-09

## 2021-04-09 DIAGNOSIS — Z01.818 PREOPERATIVE TESTING: Primary | ICD-10-CM

## 2021-04-12 ENCOUNTER — LAB (OUTPATIENT)
Dept: LAB | Facility: HOSPITAL | Age: 76
End: 2021-04-12

## 2021-04-12 LAB — SARS-COV-2 ORF1AB RESP QL NAA+PROBE: NOT DETECTED

## 2021-04-12 PROCEDURE — U0004 COV-19 TEST NON-CDC HGH THRU: HCPCS | Performed by: INTERNAL MEDICINE

## 2021-04-12 PROCEDURE — C9803 HOPD COVID-19 SPEC COLLECT: HCPCS | Performed by: INTERNAL MEDICINE

## 2021-04-15 ENCOUNTER — ANESTHESIA (OUTPATIENT)
Dept: GASTROENTEROLOGY | Facility: HOSPITAL | Age: 76
End: 2021-04-15

## 2021-04-15 ENCOUNTER — TELEPHONE (OUTPATIENT)
Dept: GASTROENTEROLOGY | Facility: CLINIC | Age: 76
End: 2021-04-15

## 2021-04-15 ENCOUNTER — HOSPITAL ENCOUNTER (OUTPATIENT)
Facility: HOSPITAL | Age: 76
Setting detail: HOSPITAL OUTPATIENT SURGERY
Discharge: HOME | End: 2021-04-15
Attending: INTERNAL MEDICINE | Admitting: INTERNAL MEDICINE

## 2021-04-15 ENCOUNTER — ANESTHESIA EVENT (OUTPATIENT)
Dept: GASTROENTEROLOGY | Facility: HOSPITAL | Age: 76
End: 2021-04-15

## 2021-04-15 VITALS
SYSTOLIC BLOOD PRESSURE: 144 MMHG | HEIGHT: 68 IN | HEART RATE: 68 BPM | RESPIRATION RATE: 19 BRPM | BODY MASS INDEX: 29.25 KG/M2 | OXYGEN SATURATION: 99 % | DIASTOLIC BLOOD PRESSURE: 87 MMHG | WEIGHT: 193 LBS | TEMPERATURE: 97.1 F

## 2021-04-15 PROCEDURE — 45378 DIAGNOSTIC COLONOSCOPY: CPT | Performed by: INTERNAL MEDICINE

## 2021-04-15 PROCEDURE — 25010000002 PROPOFOL 10 MG/ML EMULSION: Performed by: NURSE ANESTHETIST, CERTIFIED REGISTERED

## 2021-04-15 RX ORDER — SODIUM CHLORIDE 0.9 % (FLUSH) 0.9 %
10 SYRINGE (ML) INJECTION AS NEEDED
Status: DISCONTINUED | OUTPATIENT
Start: 2021-04-15 | End: 2021-04-15 | Stop reason: HOSPADM

## 2021-04-15 RX ORDER — LIDOCAINE HYDROCHLORIDE 10 MG/ML
0.5 INJECTION, SOLUTION EPIDURAL; INFILTRATION; INTRACAUDAL; PERINEURAL ONCE AS NEEDED
Status: DISCONTINUED | OUTPATIENT
Start: 2021-04-15 | End: 2021-04-15 | Stop reason: HOSPADM

## 2021-04-15 RX ORDER — LIDOCAINE HYDROCHLORIDE 20 MG/ML
INJECTION, SOLUTION EPIDURAL; INFILTRATION; INTRACAUDAL; PERINEURAL AS NEEDED
Status: DISCONTINUED | OUTPATIENT
Start: 2021-04-15 | End: 2021-04-15 | Stop reason: SURG

## 2021-04-15 RX ORDER — PROPOFOL 10 MG/ML
VIAL (ML) INTRAVENOUS AS NEEDED
Status: DISCONTINUED | OUTPATIENT
Start: 2021-04-15 | End: 2021-04-15 | Stop reason: SURG

## 2021-04-15 RX ORDER — SODIUM CHLORIDE 9 MG/ML
500 INJECTION, SOLUTION INTRAVENOUS CONTINUOUS PRN
Status: DISCONTINUED | OUTPATIENT
Start: 2021-04-15 | End: 2021-04-15 | Stop reason: HOSPADM

## 2021-04-15 RX ADMIN — SODIUM CHLORIDE 500 ML: 9 INJECTION, SOLUTION INTRAVENOUS at 12:02

## 2021-04-15 RX ADMIN — PROPOFOL 140 MG: 10 INJECTION, EMULSION INTRAVENOUS at 13:11

## 2021-04-15 RX ADMIN — LIDOCAINE HYDROCHLORIDE 40 MG: 20 INJECTION, SOLUTION EPIDURAL; INFILTRATION; INTRACAUDAL; PERINEURAL at 13:11

## 2021-04-15 NOTE — ANESTHESIA POSTPROCEDURE EVALUATION
Patient: Wilmar Reyes Jr.    Procedure Summary     Date: 04/15/21 Room / Location:  [facility]    Anesthesia Start: 1303 Anesthesia Stop: 1324    Procedure: COLONOSCOPY WITH ANESTHESIA (N/A ) Diagnosis:       Hx of colonic polyps      (Hx of colonic polyps [Z86.010])    Surgeons: Patricia Pastor MD Provider: Lee Ortega CRNA    Anesthesia Type: MAC ASA Status: 3          Anesthesia Type: MAC    Vitals  Vitals Value Taken Time   /87 04/15/21 1340   Temp     Pulse 73 04/15/21 1344   Resp 19 04/15/21 1340   SpO2 98 % 04/15/21 1344   Vitals shown include unvalidated device data.        Post Anesthesia Care and Evaluation    Patient location during evaluation: PHASE II  Patient participation: complete - patient participated  Level of consciousness: awake  Pain score: 0  Pain management: adequate  Airway patency: patent  Anesthetic complications: No anesthetic complications  PONV Status: none  Cardiovascular status: acceptable  Respiratory status: acceptable  Hydration status: acceptable

## 2021-04-15 NOTE — ANESTHESIA PREPROCEDURE EVALUATION
Anesthesia Evaluation     Patient summary reviewed and Nursing notes reviewed   no history of anesthetic complications:  NPO Solid Status: > 8 hours  NPO Liquid Status: > 2 hours           Airway   Mallampati: II  TM distance: >3 FB  Neck ROM: full  No difficulty expected  Dental      Pulmonary    (+) COPD, shortness of breath,   Cardiovascular   Exercise tolerance: good (4-7 METS)    (+) pacemaker ICD, pacemaker, hypertension, CAD (medical therapy), CHF Diastolic >=55%, hyperlipidemia,     ROS comment: 6/2020  · Diaphragmatic attenuation artifact is present.  · Left ventricular ejection fraction is normal (Calculated EF = 56%).  · Myocardial perfusion imaging indicates a small-to-medium-sized infarct located in the lateral wall and apex with smal nan-infarct ischemia noted.    2/28/20  · Left ventricular wall thickness is consistent with mild concentric hypertrophy.  · Estimated EF = 55%.  · Left ventricular systolic function is normal.  · Left ventricular diastolic dysfunction.  · No evidence of pulmonary hypertension is present    [place] [employer]  2018 interrogation report  95% LV paced  ICD now monitored at [facility]    Neuro/Psych  (-) seizures, TIA, CVA  GI/Hepatic/Renal/Endo    (+)   renal disease CRI,   (-) diabetes    Musculoskeletal     Abdominal    Substance History      OB/GYN          Other                        Anesthesia Plan    ASA 3     MAC     intravenous induction     Anesthetic plan, all risks, benefits, and alternatives have been provided, discussed and informed consent has been obtained with: patient.

## 2021-04-15 NOTE — H&P
Chief Complaint:   Adenomatous colon polyps    Subjective     HPI:   He has had an adenoma removed in the past.  Last colonoscopy was in October 2015 and showed only a hyperplastic polyp in the rectum at that point.    Past Medical History:   Past Medical History:   Diagnosis Date   • AICD (automatic cardioverter/defibrillator) present    • CAD (coronary artery disease)     7/2015- ECHO: EF 50-55% (increased from 2/2013- 25%), abnormal LV diastolic function. 8/2015- LexiScan: negative for ischemia/scar 2/2013- Cath: Mild CAD, non-ischemic cardiomyopathy.   • Chest pain, unspecified    • Chronic combined systolic and diastolic congestive heart failure (CMS/HCC)      7/2015- ECHO: EF 50-55% (increased from 2/2013- 25%), abnormal LV diastolic function.as    • COPD (chronic obstructive pulmonary disease) (CMS/HCC)    • FH: chemotherapy    • History of melanoma    • HLD (hyperlipidemia)    • Hx of scabies    • Hyperlipidemia, mixed    • Hypertension    • Immune deficiency disorder (CMS/HCC)    • Left bundle branch block    • Myocardial infarction (CMS/HCC)    • Non-ischemic cardiomyopathy (CMS/HCC)     LifeVest   • Status post implantation of automatic cardioverter/defibrillator (AICD)      Placed 4/2013. Interrogated by [facility].       Past Surgical History:  Past Surgical History:   Procedure Laterality Date   • A-V CARDIAC PACEMAKER INSERTION     • CARDIAC DEFIBRILLATOR PLACEMENT Left 02/05/2013    Sev dil nonischemic cardiomyopathy. Mild CAD.   • COLONOSCOPY  10/07/2015    One 4mm hyperplastic polyp in the rectum; The examination was otherwise normal on direct and retroflexion views; Repeat 5 years   • CORONARY ANGIOPLASTY     • LYMPH NODE BIOPSY      left leg lymp node bx 2/2016   • OTHER SURGICAL HISTORY  04/29/2013    BS BiV ICD Implantation   • SKIN CANCER EXCISION          Family History:  Family History   Problem Relation Age of Onset   • Heart disease Mother    • Heart disease Father    • Colon cancer  Neg Hx    • Colon polyps Neg Hx    • Esophageal cancer Neg Hx    • Liver cancer Neg Hx    • Liver disease Neg Hx    • Stomach cancer Neg Hx    • Rectal cancer Neg Hx        Social History:   reports that he has quit smoking. His smoking use included cigarettes. He quit after 32.00 years of use. He has never used smokeless tobacco. He reports previous alcohol use. He reports that he does not use drugs.    Medications:   Medications Prior to Admission   Medication Sig Dispense Refill Last Dose   • ammonium lactate (AMLACTIN) 12 % cream Apply 1 application topically to the appropriate area as directed 2 (Two) Times a Day As Needed for Dry Skin.   4/14/2021 at Unknown time   • carvedilol (COREG) 25 MG tablet Take 25 mg by mouth 2 (Two) Times a Day With Meals.   4/14/2021 at Unknown time   • Cholecalciferol (VITAMIN D3) 50 MCG (2000 UT) capsule Take 2,000 Units by mouth Daily.   4/14/2021 at Unknown time   • diphenhydrAMINE (BENADRYL) 25 mg capsule Take 25 mg by mouth Every 6 (Six) Hours As Needed for Itching.   4/14/2021 at Unknown time   • fenofibrate (TRICOR) 145 MG tablet Take 145 mg by mouth Daily.   4/14/2021 at Unknown time   • folic acid (FOLVITE) 1 MG tablet Take 1 mg by mouth Daily.   4/14/2021 at Unknown time   • furosemide (LASIX) 20 MG tablet Take 20 mg by mouth Daily.   4/14/2021 at Unknown time   • ipratropium-albuterol (COMBIVENT RESPIMAT)  MCG/ACT inhaler Inhale 1 puff Every 6 (Six) Hours.   4/14/2021 at Unknown time   • lisinopril (PRINIVIL,ZESTRIL) 40 MG tablet Take 20 mg by mouth Daily.   4/14/2021 at Unknown time   • oxyCODONE-acetaminophen (PERCOCET) 5-325 MG per tablet Take 1 tablet by mouth As Needed.   4/14/2021 at Unknown time   • potassium chloride ER (K-TAB) 20 MEQ tablet controlled-release ER tablet Take 10 mEq by mouth Daily.   4/14/2021 at Unknown time   • rosuvastatin (CRESTOR) 40 MG tablet Take 20 mg by mouth Every Evening.   4/14/2021 at Unknown time   • acetaminophen (TYLENOL) 325  "MG tablet Take 650 mg by mouth Every 6 (Six) Hours As Needed for Mild Pain (1-3).   4/13/2021   • apixaban (ELIQUIS) 5 MG tablet tablet Take 1 tablet by mouth 2 (Two) Times a Day. 60 tablet 11 4/11/2021   • aspirin 81 MG tablet Take 81 mg by mouth Daily.   4/12/2021   • budesonide-formoterol (SYMBICORT) 160-4.5 MCG/ACT inhaler Inhale 2 puffs 2 (Two) Times a Day.   4/12/2021   • nitroglycerin (NITROSTAT) 0.4 MG SL tablet Place 0.4 mg under the tongue Every 5 (Five) Minutes As Needed for chest pain. Take no more than 3 doses in 15 minutes.          Allergies:  Patient has no known allergies.    ROS:    General: Weight stable  Resp: No SOA  Cardiovascular: No CP      Objective     /73 (BP Location: Right arm, Patient Position: Sitting)   Pulse 70   Temp 97.1 °F (36.2 °C) (Temporal)   Resp 20   Ht 172.7 cm (68\")   Wt 87.5 kg (193 lb)   SpO2 98%   BMI 29.35 kg/m²     Physical Exam   Constitutional: Pt is oriented to person, place, and in no distress.  Eyes: No icterus  ENMT: Unremarkable   Cardiovascular: Normal rate, regular rhythm.    Pulmonary/Chest: No distress.  No audible wheezes  Abdominal: Soft.   Skin: Skin is warm and dry.   Psychiatric: Mood, memory, affect and judgment appear normal.     Assessment/Plan     Diagnosis:  History of adenomatous colon polyps    Anticipated Surgical Procedure:  Colonoscopy    The risks, benefits, and alternatives of colonoscopy were reviewed with the patient today.  Risks including perforation of the colon possibly requiring surgery or colostomy.  Additional risks include risk of bleeding from biopsies or removal of colon tissue.  There is also the risk of a drug reaction or problems with anesthesia.  This will be discussed with the further by the anesthesia team on the day of the procedure.  Lastly there is a possibility of missing a colon polyp or cancer.  The benefits include the diagnosis and management of disease of the colon and rectum.  Alternatives to " colonoscopy include barium enema, laboratory testing, radiographic evaluation, or no intervention.  The patient verbalizes understanding and agrees.    Much of this encounter note is an electronic transcription/translation of spoken language to printed text. The electronic translation of spoken language may permit erroneous, or at times, nonsensical words or phrases to be inadvertently transcribed; although I have reviewed the note for such errors, some may still exist.

## 2021-08-18 ENCOUNTER — OFFICE VISIT (OUTPATIENT)
Dept: CARDIOLOGY | Facility: CLINIC | Age: 76
End: 2021-08-18

## 2021-08-18 VITALS
DIASTOLIC BLOOD PRESSURE: 72 MMHG | OXYGEN SATURATION: 97 % | SYSTOLIC BLOOD PRESSURE: 146 MMHG | WEIGHT: 198 LBS | BODY MASS INDEX: 30.01 KG/M2 | HEIGHT: 68 IN | HEART RATE: 70 BPM

## 2021-08-18 DIAGNOSIS — I10 ESSENTIAL HYPERTENSION: Chronic | ICD-10-CM

## 2021-08-18 DIAGNOSIS — I50.42 CHRONIC COMBINED SYSTOLIC AND DIASTOLIC CONGESTIVE HEART FAILURE (HCC): Chronic | ICD-10-CM

## 2021-08-18 DIAGNOSIS — I42.8 NON-ISCHEMIC CARDIOMYOPATHY (HCC): Primary | Chronic | ICD-10-CM

## 2021-08-18 DIAGNOSIS — Z95.810 AICD (AUTOMATIC CARDIOVERTER/DEFIBRILLATOR) PRESENT: ICD-10-CM

## 2021-08-18 DIAGNOSIS — E78.2 MIXED HYPERLIPIDEMIA: Chronic | ICD-10-CM

## 2021-08-18 PROCEDURE — 99214 OFFICE O/P EST MOD 30 MIN: CPT | Performed by: NURSE PRACTITIONER

## 2021-08-18 RX ORDER — SACUBITRIL AND VALSARTAN 49; 51 MG/1; MG/1
1 TABLET, FILM COATED ORAL 2 TIMES DAILY
Qty: 60 TABLET | Refills: 0 | Status: SHIPPED | OUTPATIENT
Start: 2021-08-18 | End: 2021-09-24

## 2021-08-18 NOTE — PROGRESS NOTES
Chief Complaint  CUEVAS (6mo- recent GEN change, no issues at this time. )    Subjective          Wilmar Reyes Jr. presents to [facility] for routine follow-up.  He has a history of nonischemic cardiomyopathy, chronic combined systolic and diastolic congestive heart failure, AICD ([facility]itored by [facility]), hypertension, hyperlipidemia and obesity.  Patient denies chest pain, shortness of breath, palpitations, dizziness, syncope, orthopnea, PND, edema or decreased stamina.  Patient denies any signs of bleeding.    Cardiomyopathy  This is a chronic problem. The current episode started more than 1 year ago. Pertinent negatives include no abdominal pain, anorexia, arthralgias, change in bowel habit, chest pain, chills, congestion, coughing, diaphoresis, fatigue, fever, headaches, joint swelling, myalgias, nausea, neck pain, numbness, rash, sore throat, swollen glands, urinary symptoms, vertigo, visual change, vomiting or weakness.   Congestive Heart Failure  Presents for follow-up visit. Pertinent negatives include no abdominal pain, chest pain, chest pressure, claudication, edema, fatigue, muscle weakness, near-syncope, nocturia, orthopnea, palpitations, paroxysmal nocturnal dyspnea, shortness of breath or unexpected weight change. The symptoms have been stable. Compliance with total regimen is 51-75%. Compliance with diet is 51-75%. Compliance with exercise is 51-75%. Compliance with medications is %.   Hypertension  This is a chronic problem. The current episode started more than 1 year ago. The problem is controlled. Pertinent negatives include no anxiety, blurred vision, chest pain, headaches, malaise/fatigue, neck pain, orthopnea, palpitations, peripheral edema, PND, shortness of breath or sweats. Risk factors for coronary artery disease include male gender and dyslipidemia. Current antihypertension treatment includes ACE inhibitors, beta blockers and diuretics. The current treatment  "provides significant improvement. Hypertensive end-organ damage includes heart failure.   Hyperlipidemia  This is a chronic problem. The current episode started more than 1 year ago. Pertinent negatives include no chest pain, myalgias or shortness of breath. Current antihyperlipidemic treatment includes statins. Risk factors for coronary artery disease include hypertension, male sex and dyslipidemia.       Objective   Vital Signs:   /72   Pulse 70   Ht 172.7 cm (68\")   Wt 89.8 kg (198 lb)   SpO2 97%   BMI 30.11 kg/m²     Vitals and nursing note reviewed.   Constitutional:       General: Awake.      Appearance: Normal and healthy appearance. Well-developed, normal weight and not in distress.   Eyes:      General: Lids are normal.      Conjunctiva/sclera: Conjunctivae normal.      Pupils: Pupils are equal, round, and reactive to light.   HENT:      Head: Normocephalic and atraumatic.      Nose: Nose normal.   Neck:      Vascular: No JVR. JVD normal.   Pulmonary:      Effort: Pulmonary effort is normal.      Breath sounds: Normal breath sounds. No wheezing. No rhonchi. No rales.   Chest:      Chest wall: Not tender to palpatation.   Cardiovascular:      PMI at left midclavicular line. Normal rate. Regular rhythm. Normal S1. Normal S2.      Murmurs: There is a grade 2/4 high frequency blowing decrescendo, early diastolic murmur at the URSB, radiating to the apex.      No gallop. No click. No rub.   Pulses:     Intact distal pulses.   Edema:     Peripheral edema absent.   Abdominal:      General: Bowel sounds are normal.      Palpations: Abdomen is soft.      Tenderness: There is no abdominal tenderness.   Musculoskeletal: Normal range of motion.         General: No tenderness.      Cervical back: Normal range of motion. Skin:     General: Skin is warm and dry.   Neurological:      General: No focal deficit present.      Mental Status: Alert, oriented to person, place, and time and oriented to person, place " and time.   Psychiatric:         Attention and Perception: Attention and perception normal.         Mood and Affect: Mood and affect normal.         Speech: Speech normal.         Behavior: Behavior normal. Behavior is cooperative.         Thought Content: Thought content normal.         Cognition and Memory: Cognition and memory normal.         Judgment: Judgment normal.        Result Review :   The following data was reviewed by: RACHEL Toth on 08/02/2021:      Data reviewed: Cardiology studies lexiscan 6/20/20.           Assessment and Plan    Diagnoses and all orders for this visit:    1. Non-ischemic cardiomyopathy (CMS/HCC) (Primary)-EF previously reduced at 25% in 2013 and now improved to normal on most recent 2D echo 2/27/2020.    2. Chronic combined systolic and diastolic congestive heart failure (CMS/HCC)-NYHA class II.  Compensated.  Stop lisinopril.  Start Entresto 49/51 mg twice daily on the morning of Friday, 8/20/2021 following 36-hour washout.  BMP in 1 week. Reviewed signs and symptoms of CHF and what to report with the patient. Patient instructed to restrict sodium and weigh daily. Report weight gain of greater than 2 lbs overnight or 5 lbs in 1 week. Pt verbalized understanding of instructions and plan of care. Consider addition of Farxiga and spironolactone in future.     3. AICD (automatic cardioverter/defibrillator) present-[facility] per [facility].    4. Essential hypertension-blood pressures well controlled.  Monitor and record daily blood pressure. Report readings consistently higher than 140/90 or consistently lower than 100/60.     5. Mixed hyperlipidemia-management per PCP.  Continue Crestor.        Follow Up   Return in about 4 weeks (around 9/15/2021) for Next scheduled follow up.  Patient was given instructions and counseling regarding his condition or for health maintenance advice. Please see specific information pulled into the AVS if appropriate.

## 2021-09-24 RX ORDER — SACUBITRIL AND VALSARTAN 49; 51 MG/1; MG/1
TABLET, FILM COATED ORAL
Qty: 180 TABLET | Refills: 4 | Status: SHIPPED | OUTPATIENT
Start: 2021-09-24

## 2022-03-01 ENCOUNTER — TELEPHONE (OUTPATIENT)
Dept: CARDIOLOGY | Facility: CLINIC | Age: 77
End: 2022-03-01

## 2022-03-01 NOTE — TELEPHONE ENCOUNTER
Call was placed to patient to acquire about N/S appt from August for medication change and labs. No answer and no VM to leave message. Calls have been placed in the past also. There is a [facility] denial letter for appts in chart and believed to be the reasoning for no longer coming.

## 2022-03-02 NOTE — TELEPHONE ENCOUNTER
Per office note from [facility] in [place] the [facility] has not authorized further visits with Cardiology outside of the [facility]. He is no longer taking Entresto as the [facility] did not approve it either and has transitioned back to Lisinopril. They are going to see if they can get [facility] to authorize further appts.

## 2022-05-18 ENCOUNTER — HOSPITAL ENCOUNTER (OUTPATIENT)
Dept: PAIN MANAGEMENT | Age: 77
Discharge: HOME | End: 2022-05-18
Payer: OTHER GOVERNMENT

## 2022-05-18 VITALS
SYSTOLIC BLOOD PRESSURE: 147 MMHG | OXYGEN SATURATION: 93 % | HEART RATE: 72 BPM | TEMPERATURE: 97 F | DIASTOLIC BLOOD PRESSURE: 75 MMHG | BODY MASS INDEX: 33.65 KG/M2 | WEIGHT: 222 LBS | HEIGHT: 68 IN

## 2022-05-18 DIAGNOSIS — M79.18 MYOFASCIAL MUSCLE PAIN: ICD-10-CM

## 2022-05-18 DIAGNOSIS — M62.830 MUSCLE SPASM OF BACK: ICD-10-CM

## 2022-05-18 DIAGNOSIS — M79.605 LEG PAIN, DIFFUSE, LEFT: ICD-10-CM

## 2022-05-18 PROCEDURE — 99205 OFFICE O/P NEW HI 60 MIN: CPT

## 2022-05-18 PROCEDURE — 99203 OFFICE O/P NEW LOW 30 MIN: CPT | Performed by: NURSE PRACTITIONER

## 2022-05-18 RX ORDER — DIPHENHYDRAMINE HCL 25 MG
25 CAPSULE ORAL EVERY 6 HOURS PRN
COMMUNITY

## 2022-05-18 RX ORDER — SACUBITRIL AND VALSARTAN 49; 51 MG/1; MG/1
TABLET, FILM COATED ORAL
COMMUNITY
Start: 2021-09-24

## 2022-05-18 RX ORDER — ACETAMINOPHEN 325 MG/1
650 TABLET ORAL EVERY 6 HOURS PRN
COMMUNITY

## 2022-05-18 RX ORDER — FENOFIBRATE 145 MG/1
145 TABLET, COATED ORAL
COMMUNITY
Start: 2022-04-29

## 2022-05-18 RX ORDER — NALOXONE HYDROCHLORIDE 4 MG/.1ML
SPRAY NASAL
COMMUNITY
Start: 2021-07-06

## 2022-05-18 RX ORDER — OXYCODONE HYDROCHLORIDE AND ACETAMINOPHEN 5; 325 MG/1; MG/1
TABLET ORAL
COMMUNITY
Start: 2022-04-29 | End: 2022-06-23 | Stop reason: ALTCHOICE

## 2022-05-18 RX ORDER — AMMONIUM LACTATE 12 G/100G
LOTION TOPICAL
COMMUNITY
Start: 2021-06-07

## 2022-05-18 RX ORDER — NITROGLYCERIN 0.4 MG/1
0.4 TABLET SUBLINGUAL
COMMUNITY
Start: 2021-07-06

## 2022-05-18 RX ORDER — HYDROCODONE BITARTRATE AND ACETAMINOPHEN 5; 325 MG/1; MG/1
1 TABLET ORAL 2 TIMES DAILY PRN
Qty: 60 TABLET | Refills: 0 | Status: SHIPPED | OUTPATIENT
Start: 2022-05-20 | End: 2022-06-20 | Stop reason: SDUPTHER

## 2022-05-18 RX ORDER — FUROSEMIDE 20 MG/1
20 TABLET ORAL
COMMUNITY
Start: 2022-03-08

## 2022-05-18 RX ORDER — KETOCONAZOLE 20 MG/G
CREAM TOPICAL
COMMUNITY
Start: 2022-04-27

## 2022-05-18 RX ORDER — ROSUVASTATIN CALCIUM 40 MG/1
40 TABLET, COATED ORAL DAILY
COMMUNITY

## 2022-05-18 RX ORDER — CARVEDILOL 25 MG/1
25 TABLET ORAL
COMMUNITY
Start: 2022-04-19

## 2022-05-18 RX ORDER — LISINOPRIL 40 MG/1
40 TABLET ORAL
COMMUNITY
Start: 2022-03-02

## 2022-05-18 RX ORDER — FLUTICASONE PROPIONATE AND SALMETEROL 250; 50 UG/1; UG/1
POWDER RESPIRATORY (INHALATION)
COMMUNITY
Start: 2021-12-21

## 2022-05-18 RX ORDER — POTASSIUM CHLORIDE 20 MEQ/1
10 TABLET, EXTENDED RELEASE ORAL
COMMUNITY
Start: 2022-03-08

## 2022-05-18 RX ORDER — FOLIC ACID 1 MG/1
1 TABLET ORAL
COMMUNITY
Start: 2022-04-29

## 2022-05-18 RX ORDER — BUDESONIDE AND FORMOTEROL FUMARATE DIHYDRATE 160; 4.5 UG/1; UG/1
2 AEROSOL RESPIRATORY (INHALATION) 2 TIMES DAILY
COMMUNITY

## 2022-05-18 RX ORDER — ASPIRIN 81 MG/1
81 TABLET ORAL DAILY
COMMUNITY

## 2022-05-18 RX ORDER — MEGESTROL ACETATE 40 MG/ML
800 SUSPENSION ORAL DAILY
COMMUNITY

## 2022-05-18 NOTE — H&P
[facility] Physical and Pain Medicine    History and Physical    Patient Name: Demetrius Matos    MR #: [id number]    Account #: [de-identified]    : 1945    Age: 68 y.o. Sex: male    Date: May 18, 2022    PCP: No primary care provider on file. Referring Provider:    Chief Complaint:   Chief Complaint   Patient presents with    Lower Back Pain       History of Present Illness:    Demetrius Matos is a 68 y.o. male who presents to the office with primary complaints of mid back pain and left groin pain. He has a history of removal of left lymph node removal from groin which has left him with pain. He also has complaint of mid back pain. Says that he was hit by a forklift in [address] [place] [address] and has had pain in his back since. He has been on Percocet for 6 months from the [place] and the doctors changed so he is not provided his pain medication by the [place] any more. Explains that sitting, standing, walking or bending forwards or backwards make the pain worse, with the pain medication and sometimes walking seems to help. He has tried Motrin with no improvement in his pain. He does have a history of pacemaker/defibrillator with generator change last year. Also has had shingles on his left flank area that says does not ever bother him. He has had no x-rays or PT for his pain. Did used to smoke and drink alcohol, but stopped 10 years ago. 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 Yes  []    No [x]   Did Physical Therapy make thepain better or worse? Better []   Worse []  Unchanged []     MRI in the last two years? Yes []  No [x]  Results reviewed with patient? Yes []   No []    CT Scan in the last two years? Yes  []   No [x]  Results reviewed with patient?    Yes []   No []     X-ray in the last two years? Yes []   No  [x]  Results reviewed with patient? Yes  []  No []     Injections in the past?  Yes []   No [x]   Did the injections help relieve the pain? Yes []   No []     Do you have Depression? Yes  []    No [x]  Thinking of harming yourself or others? Yes  []   No [x]    Past Medical Histoy  Past Medical History:   Diagnosis Date    Arthritis     Cerebral artery occlusion with cerebral infarction ([facility] [address]     Hypertension        Surgery History  Past Surgical History:   Procedure Laterality Date    CARDIAC SURGERY      KNEE SURGERY Left         Allergies  Patient has no known allergies. Current Medications  Current Outpatient Medications   Medication Sig Dispense Refill    ammonium lactate (LAC-HYDRIN) 12 % lotion Apply topically      apixaban (ELIQUIS) 5 MG TABS tablet Take 5 mg by mouth      carvedilol (COREG) 25 MG tablet Take 25 mg by mouth      fenofibrate (TRICOR) 145 MG tablet Take 145 mg by mouth      fluticasone-salmeterol (ADVAIR) 250-50 MCG/ACT AEPB diskus inhaler Inhale into the lungs      folic acid (FOLVITE) 1 MG tablet Take 1 mg by mouth      furosemide (LASIX) 20 MG tablet Take 20 mg by mouth      albuterol-ipratropium (COMBIVENT RESPIMAT)  MCG/ACT AERS inhaler Inhale into the lungs      ketoconazole (NIZORAL) 2 % cream Apply topically      lisinopril (PRINIVIL;ZESTRIL) 40 MG tablet Take 40 mg by mouth      naloxone 4 MG/0.1ML LIQD nasal spray by Nasal route      nitroGLYCERIN (NITROSTAT) 0.4 MG SL tablet Place 0.4 mg under the tongue      oxyCODONE-acetaminophen (PERCOCET) 5-325 MG per tablet Take by mouth.       potassium chloride (KLOR-CON M) 20 MEQ extended release tablet Take 10 mEq by mouth      sacubitril-valsartan (ENTRESTO) 49-51 MG per tablet TAKE 1 TABLET BY MOUTH TWICE DAILY      [START ON 5/20/2022] HYDROcodone-acetaminophen (NORCO) 5-325 MG per tablet Take 1 tablet by mouth 2 times daily as needed for Pain for up to 30 days. May fill 5- 60 tablet 0    acetaminophen (TYLENOL) 325 MG tablet Take 650 mg by mouth every 6 hours as needed      aspirin 81 MG EC tablet Take 81 mg by mouth daily      budesonide-formoterol (SYMBICORT) 160-4.5 MCG/ACT AERO Inhale 2 puffs into the lungs 2 times daily      Cholecalciferol 50 MCG (2000 UT) CAPS Take 1 capsule by mouth daily      diphenhydrAMINE (BENADRYL) 25 MG capsule Take 25 mg by mouth every 6 hours as needed      megestrol (MEGACE) 40 MG/ML suspension Take 800 mg by mouth daily      rosuvastatin (CRESTOR) 40 MG tablet Take 40 mg by mouth daily       No current facility-administered medications for this encounter. Social History    Social History     Tobacco Use    Smoking status: Former Smoker    Smokeless tobacco: Never Used   Substance Use Topics    Alcohol use: Not Currently         Family History  family history is not on file. Review of Systems:  Constitutional: denies fever, chills, fatigue, change in appetite, weight gain or weight loss  Head: Normocephalic  Skin: denies easy bruising, skin redness, skin rash, hives, sensitivity to sun exposure, tightness, nodules or bumps, hair loss, color changes in the hands or feet, or feeling of temperature change such as coldness  Eyes: denies pain, redness, loss of vision, double or blurred vision, eye drainage, or dryness.    ENT and Mouth: denies ringing in the ears, loss of hearing, nasal congestion, nasal discharge, no hoarseness, sore throat, or difficulty swallowing   Respiratory: denies chronic dry cough, coughing up blood, coughing up mucus, waking at night coughing or choking, or wheezing  Cardiovascular: denies chest pain, irregular heartbeats, palpitations, shortness of breath, or edema in legs  Gastrointestinal: denies, nausea, vomiting, heartburn, diarrhea, constipation  Genitourinary: denies difficult urination, pain or burning with urination, blood in the urine, or cloudy urine  Musculoskeletal: denies arm, buttock, thigh or calf cramps. Has pain in left groin area and mid back, muscle spasms and tenderness in left groin area and mid back. No muscle weakness. No joint swelling. Neurologic: headache, dizziness, fainting, loss of consciousness, no sensitivity, no memory loss. .    Endocrine: denies intolerance to hot or cold temperature, night sweats, flushing, fingernail changes, increased thirst, or hairloss   Hematologic/ Lymphatic: denies anemia, bleeding tendency or clotting tendency, bruising easily. Allergic/ Immunologic: denies rhinitis, asthma, skin sensitivity, or Latex allergy  Psychiatric: denies depression or thoughts of suicide, or voices in head. Current Pain Assessment:        Clinical Progression: gradually improving  Effect of Pain on Daily Activities: limits activity  Patient's Stated Pain Goal: No pain  Pain Intervention(s): Medication (see eMar), Repositioning, Rest, Ice    Current PE    ORT Score: 7    PHQ-9 Score: 11    Physical Exam:    Vitals:    22 0915   BP: (!) 147/75   Pulse: 72   Temp: 97 °F (36.1 °C)   TempSrc: Temporal   SpO2: 93%   Weight: 222 lb (100.7 kg)   Height: 5' 8\" (1.727 m)       Body mass index is 33.75 kg/m². General Appearance: No acute distress. Appears to be well dressed  Skin Exam: Warm and dry, no jaundice, rashes or leasions  Head Exam: NCAT, PERRLA, EOMI, scalp normal  Eye Exam: PERRLA, EOMI, conjunctivae clear  Ear Exam: Normal external auricles. No drainage from ear canals  Nose Exam: Normal alignment. Turbinates clear. No drainage  Mouth Exam: Oral mucosa pink and moist. Gums pink. Throat Exam: Posterior pharynx pink in color with no edema  Neck Exam: Supple, trachea midline. No masses palpated. Respiratory Exam: Clear to ausculation in all lobes anterior and posterior. Cardiovascular Exam: Regular rate and rhythm, no gallops, no rubs or murmurs.  No edema in extremities  Gastrointestinal Exam: Bowel sounds in all quadrants, soft, non-distended, non-tender with palpation, no guarding   Musculoskeletal Exam: No joint swelling or deformity. Tenderness with palpation of left groin post surgical area  Back Exam: Tenderness with palpation of muscles in thoracic area  Hip Exam: Full rotation bilateral  Shoulder Exam: Full rotation bilateral  Knee Exam: Full flexion and extension bilateral  Extremities: No rash, cyanosis or bruising  Neurologic Exam: Gait and coordination normal, speech normal and clear  Reflexes: Normal brachialis, Negative Villalba's bilateral. Normal Patellar bilateral,   CN EXAM: II-XII intact, face symmetrical, tongue symmetrical, the trapezius and sternocleidomastoid muscle appearance and strength symmetrical, normal achilles bilateral, ankle clonus negative bilateral  Strength: 5/5 RUE Bi's/Tri's, 5/5 LUE Bi's/Tri's, 5/5 RLE knee flex/ext, 5/5 RLE DF/PF, 5/5 LLE knee flex/ext, 5/5 LLE DF/PF  Sensation: Equal and intact to fine touch in all extremities  Mood and affect: Normal limits  Nurses note reviewed along with current vital signs    Active Problem(s)  Active Problems:    Muscle spasm of back    Leg pain, diffuse, left    Myofascial muscle pain  Resolved Problems:    * No resolved hospital problems. *                                                                                                                                 PLAN:  1. Patient is to call the office with any questions or concerns that may arise prior to next appointment. 2. D/C Percocet  3. Norco 5 mg BID prn #60 (sent to [place])  4. Schedule patient for bilateral Thoracic Trigger Point injections    Urine Drug Screen Today:   Yes  []    No  [x]     Discussion:  Discussed exam findings and plan of care with patient. Patient agreed with the current plan of care at this time. All questions from the patient were answered by the provider.     Activity:   Discussed exercise as beneficial to pain reduction, encouraged stretching exercise with a focus on torso strengthening. Education Provided:  Review of Avni Young [x]  Agreement Review  [x]  Reviewed PHQ-9  [x]      Reviewed ORT [x]  Review of Test  [x]  Compliance Issues Discussed [x]   Cognitive Behavior Needs  []  Exercise  [x]  Financial Issues  []   Tobacco/Alcohol Use  []  Teaching  [x]   New Patient Picture Obtained  [x]      [] Benzodiazapine's and Narcotics:  Patient educated on the possible effects of combining Benzodiazapine's and Opioids. Explained \"Black Box Warnings\" such as; possible suppressed breathing, hypoxia, anoxia, depressed cognition, heart arrhythmia, coma and possible death. Patient verbalized understanding concerning possible effects. Controlled Substance Monitoring:   Attestation: The KAYLAH report for this patient was reviewed today. Discussed with patient possible medication side effects, risk of tolerance, dependence and alternative treatments. Discussed thegrowing epidemic in the [place] with the overprescribing and at times the abuse of narcotics. Discussed the detrimental effects of long term narcotic use. Patient encouraged to set daily goals of exercising and decreasing dailynarcotic intake. Discussed with the patient about the development of hyperalgesia with long term narcotic intake. EMR dragon/transcription disclaimer: Much of this encounter note is electronic transcription/translation of spoken language to printed tach. Electronic translation of spoken language may be erroneous, or at times, nonsensical words or phrases may be inadvertently transcribed. Although, I have reviewed the note for such errors, some may still exist.    CC:  No primary care provider on file. Thank you for this kind referral and allowing me to participate    in your patients care.     1 [facility], [facility], 5/18/2022 at 10:46 AM

## 2022-06-20 DIAGNOSIS — M79.18 MYOFASCIAL MUSCLE PAIN: ICD-10-CM

## 2022-06-20 DIAGNOSIS — M62.830 MUSCLE SPASM OF BACK: ICD-10-CM

## 2022-06-20 DIAGNOSIS — M79.605 LEG PAIN, DIFFUSE, LEFT: ICD-10-CM

## 2022-06-20 RX ORDER — HYDROCODONE BITARTRATE AND ACETAMINOPHEN 5; 325 MG/1; MG/1
1 TABLET ORAL 2 TIMES DAILY PRN
Qty: 60 TABLET | Refills: 0 | Status: SHIPPED | OUTPATIENT
Start: 2022-06-20 | End: 2022-07-18 | Stop reason: SDUPTHER

## 2022-06-23 ENCOUNTER — HOSPITAL ENCOUNTER (OUTPATIENT)
Dept: PAIN MANAGEMENT | Age: 77
Discharge: HOME | End: 2022-06-23
Payer: OTHER GOVERNMENT

## 2022-06-23 VITALS
RESPIRATION RATE: 18 BRPM | HEART RATE: 70 BPM | DIASTOLIC BLOOD PRESSURE: 69 MMHG | TEMPERATURE: 97.4 F | SYSTOLIC BLOOD PRESSURE: 143 MMHG | OXYGEN SATURATION: 97 %

## 2022-06-23 PROCEDURE — 20553 NJX 1/MLT TRIGGER POINTS 3/>: CPT | Performed by: NURSE PRACTITIONER

## 2022-06-23 PROCEDURE — 20553 NJX 1/MLT TRIGGER POINTS 3/>: CPT

## 2022-06-23 PROCEDURE — 2500000003 HC RX 250 WO HCPCS

## 2022-06-23 RX ORDER — LIDOCAINE HYDROCHLORIDE 10 MG/ML
10 INJECTION, SOLUTION EPIDURAL; INFILTRATION; INTRACAUDAL; PERINEURAL ONCE
Status: DISCONTINUED | OUTPATIENT
Start: 2022-06-23 | End: 2022-06-25 | Stop reason: HOSPADM

## 2022-06-23 RX ORDER — BUPIVACAINE HYDROCHLORIDE 5 MG/ML
10 INJECTION, SOLUTION EPIDURAL; INTRACAUDAL ONCE
Status: DISCONTINUED | OUTPATIENT
Start: 2022-06-23 | End: 2022-06-25 | Stop reason: HOSPADM

## 2022-06-23 NOTE — PROGRESS NOTES
Procedure:  Level of Consciousness: [x]Alert [x]Oriented []Disoriented []Lethargic  Anxiety Level: [x]Calm []Anxious []Depressed []Other  Skin: [x]Warm [x]Dry []Cool []Moist []Intact []Other  Cardiovascular: [x]Palpitations: []Never [x]Occasionally []Frequently  PATIENT HAS PACEMAKER  Chest Pain: [x]No []Yes  Respiratory:  [x]Unlabored []Labored []Cough ([] Productive []Unproductive)  HCG Required: [x]No []Yes   Results: []Negative []Positive  Knowledge Level:        [x]Patient/Other verbalized understanding of pre-procedure instructions. [x]Assessment of post-op care needs (transportation, responsible caregiver)        [x]Able to discuss health care problems and how to deal with it.   Factors that Affect Teaching:        Language Barrier: [x]No []Yes - why:        Hearing Loss:        [x]No []Yes            Corrective Device:  []Yes []No        Vision Loss:           []No [x]Yes            Corrective Device:  [x]Yes []No        Memory Loss:       [x]No []Yes            []Short Term []Long Term  Motivational Level:  [x]Asks Questions                  []Extremely Anxious       [x]Seems Interested               []Seems Uninterested                  [x]Denies need for Education  Risk for Injury:  [x]Patient oriented to person, place and time  []History of frequent falls/loss of balance  Nutritional:  []Change in appetite   []Weight Gain   []Weight Loss  Functional:  []Requires assistance with ADL's

## 2022-06-24 NOTE — PROCEDURES
[facility] Physical and Pain Medicine      Patient Name: Bhavin Johnson : 1945                    Age: 68 y.o. Sex: male    Date: 2022    Pre-op Diagnosis: Myofascial Pain/ Muscle Spasms    Post-op Diagnosis: Myofascial Pain/ Muscle Spasms    Procedure: Lumbar Trigger Point Injections    Performing Procedure: Fabi Renée, MSN, APRN, FNP-C    Previously Had Procedure:  [] Yes    [x] No    Patient Vitals for the past 24 hrs:   BP Temp Temp src Pulse Resp SpO2   22 0924 (!) 143/69 97.4 °F (36.3 °C) Temporal 70 18 97 %       Description of Procedure:    After a brief physical assessment and failure to improve with conservative measures the patient presented for Lumbar Trigger Point Injections. The indications, limitations and possible complications were discussed with the patient and the patient elected to proceed with the procedure. After voluntary, informed and signed consent obtained the patient was placed in a   [] Sitting  []  Prone position     Appropriate time out was obtained per policy. The area of maximal tenderness was palpated over the  [] Right   [] Left   [x] Bilateral Lower  [x] Latissimus  [x] Erector Spinae   [x] Lumbar Paraspinous. The skin overlying these areas was marked with a skin marker. The skin overlying the proposed injection sites were then sprayed with Gebauer's Solution and prepped in a sterile fashion with Prevantics swab. Each trigger point of the  [] Right   [] Left   [x] Bilateral Lower  [x] Latissimus  [x] Erector Spinae   [x] Lumbar Paraspinous  was then injected after negative aspiration using a 25 gauge 1 1/2 in needle with approximately 2 ml of a solution of     [x] 5 ml of 1% Lidocaine Plain and 5 ml of 0.5% Marcaine Plain per 10 ml syringe  [] Toradol 0.5 ml (30 mg/ml) per 10 ml syringe    Sterile dressings applied. Discharge: The patient tolerated the procedure well.  There were no complications during the procedure and the patient was discharged home with discharge instructions. The patient has been instructed to contact the office should there be any complications or questions to arise between today and their next appointment.     Plan:  [x] Will return to the office in  [] 1 month  [x]  4 - 6 weeks  [] 2 months   [] 3 months for:  [] Planned Procedure [x] Procedure Follow-up  [] Office Visit      INDER Travis, 6/23/2022 at 8:30 PM

## 2022-07-15 ENCOUNTER — HOSPITAL ENCOUNTER (OUTPATIENT)
Age: 77
Setting detail: OUTPATIENT SURGERY
Discharge: HOME | End: 2022-07-15
Attending: INTERNAL MEDICINE | Admitting: INTERNAL MEDICINE
Payer: OTHER GOVERNMENT

## 2022-07-15 ENCOUNTER — ANESTHESIA EVENT (OUTPATIENT)
Dept: ENDOSCOPY | Age: 77
End: 2022-07-15
Payer: OTHER GOVERNMENT

## 2022-07-15 ENCOUNTER — ANESTHESIA (OUTPATIENT)
Dept: ENDOSCOPY | Age: 77
End: 2022-07-15
Payer: OTHER GOVERNMENT

## 2022-07-15 VITALS
WEIGHT: 216 LBS | BODY MASS INDEX: 32.74 KG/M2 | RESPIRATION RATE: 18 BRPM | DIASTOLIC BLOOD PRESSURE: 89 MMHG | HEART RATE: 70 BPM | SYSTOLIC BLOOD PRESSURE: 194 MMHG | HEIGHT: 68 IN | TEMPERATURE: 97.2 F | OXYGEN SATURATION: 96 %

## 2022-07-15 PROCEDURE — 7100000010 HC PHASE II RECOVERY - FIRST 15 MIN: Performed by: INTERNAL MEDICINE

## 2022-07-15 PROCEDURE — 7100000011 HC PHASE II RECOVERY - ADDTL 15 MIN: Performed by: INTERNAL MEDICINE

## 2022-07-15 PROCEDURE — 45378 DIAGNOSTIC COLONOSCOPY: CPT | Performed by: INTERNAL MEDICINE

## 2022-07-15 PROCEDURE — 2580000003 HC RX 258: Performed by: INTERNAL MEDICINE

## 2022-07-15 PROCEDURE — 6360000002 HC RX W HCPCS: Performed by: NURSE ANESTHETIST, CERTIFIED REGISTERED

## 2022-07-15 PROCEDURE — 3700000000 HC ANESTHESIA ATTENDED CARE: Performed by: INTERNAL MEDICINE

## 2022-07-15 PROCEDURE — 2500000003 HC RX 250 WO HCPCS: Performed by: NURSE ANESTHETIST, CERTIFIED REGISTERED

## 2022-07-15 PROCEDURE — 3609027000 HC COLONOSCOPY: Performed by: INTERNAL MEDICINE

## 2022-07-15 PROCEDURE — 2709999900 HC NON-CHARGEABLE SUPPLY: Performed by: INTERNAL MEDICINE

## 2022-07-15 PROCEDURE — 3700000001 HC ADD 15 MINUTES (ANESTHESIA): Performed by: INTERNAL MEDICINE

## 2022-07-15 RX ORDER — LIDOCAINE HYDROCHLORIDE 10 MG/ML
INJECTION, SOLUTION INFILTRATION; PERINEURAL PRN
Status: DISCONTINUED | OUTPATIENT
Start: 2022-07-15 | End: 2022-07-15 | Stop reason: SDUPTHER

## 2022-07-15 RX ORDER — PROPOFOL 10 MG/ML
INJECTION, EMULSION INTRAVENOUS PRN
Status: DISCONTINUED | OUTPATIENT
Start: 2022-07-15 | End: 2022-07-15 | Stop reason: SDUPTHER

## 2022-07-15 RX ORDER — SODIUM CHLORIDE, SODIUM LACTATE, POTASSIUM CHLORIDE, CALCIUM CHLORIDE 600; 310; 30; 20 MG/100ML; MG/100ML; MG/100ML; MG/100ML
INJECTION, SOLUTION INTRAVENOUS CONTINUOUS
Status: DISCONTINUED | OUTPATIENT
Start: 2022-07-15 | End: 2022-07-15 | Stop reason: HOSPADM

## 2022-07-15 RX ADMIN — SODIUM CHLORIDE, POTASSIUM CHLORIDE, SODIUM LACTATE AND CALCIUM CHLORIDE: 600; 310; 30; 20 INJECTION, SOLUTION INTRAVENOUS at 08:34

## 2022-07-15 RX ADMIN — LIDOCAINE HYDROCHLORIDE 50 MG: 10 INJECTION, SOLUTION INFILTRATION; PERINEURAL at 09:51

## 2022-07-15 RX ADMIN — PROPOFOL 230 MG: 10 INJECTION, EMULSION INTRAVENOUS at 09:52

## 2022-07-15 ASSESSMENT — PAIN - FUNCTIONAL ASSESSMENT: PAIN_FUNCTIONAL_ASSESSMENT: 0-10

## 2022-07-15 NOTE — ANESTHESIA PRE PROCEDURE
Department of Anesthesiology  Preprocedure Note       Name:  Eulogio Dukes. Age:  68 y.o.  :  1945                                          MRN:  [id number]         Date:  7/15/2022      Surgeon: Chris Goyal):  Luana Talley MD    Procedure: Procedure(s):  COLORECTAL CANCER SCREENING, NOT HIGH RISK    Medications prior to admission:   Prior to Admission medications    Medication Sig Start Date End Date Taking? Authorizing Provider   HYDROcodone-acetaminophen (NORCO) 5-325 MG per tablet Take 1 tablet by mouth 2 times daily as needed for Pain for up to 30 days.  5/94/94 3/80/75  Marianna G INDER Ashley   acetaminophen (TYLENOL) 325 MG tablet Take 650 mg by mouth every 6 hours as needed    Historical Provider, MD   ammonium lactate (LAC-HYDRIN) 12 % lotion Apply topically 21   Historical Provider, MD   apixaban (ELIQUIS) 5 MG TABS tablet Take 5 mg by mouth 17   Historical Provider, MD   aspirin 81 MG EC tablet Take 81 mg by mouth daily    Historical Provider, MD   budesonide-formoterol (SYMBICORT) 160-4.5 MCG/ACT AERO Inhale 2 puffs into the lungs 2 times daily    Historical Provider, MD   carvedilol (COREG) 25 MG tablet Take 25 mg by mouth 22   Historical Provider, MD   Cholecalciferol 50 MCG (2000 UT) CAPS Take 1 capsule by mouth daily    Historical Provider, MD   diphenhydrAMINE (BENADRYL) 25 MG capsule Take 25 mg by mouth every 6 hours as needed    Historical Provider, MD   fenofibrate (TRICOR) 145 MG tablet Take 145 mg by mouth 22   Historical Provider, MD   fluticasone-salmeterol (ADVAIR) 250-50 MCG/ACT AEPB diskus inhaler Inhale into the lungs 21   Historical Provider, MD   folic acid (FOLVITE) 1 MG tablet Take 1 mg by mouth 22   Historical Provider, MD   furosemide (LASIX) 20 MG tablet Take 20 mg by mouth 3/8/22   Historical Provider, MD   albuterol-ipratropium (COMBIVENT RESPIMAT)  MCG/ACT AERS inhaler Inhale into the lungs 22   Historical Provider, MD ketoconazole (NIZORAL) 2 % cream Apply topically 4/27/22   Historical Provider, MD   lisinopril (PRINIVIL;ZESTRIL) 40 MG tablet Take 40 mg by mouth 3/2/22   Historical Provider, MD   megestrol (MEGACE) 40 MG/ML suspension Take 800 mg by mouth daily    Historical Provider, MD   naloxone 4 MG/0.1ML LIQD nasal spray by Nasal route 7/6/21   Historical Provider, MD   nitroGLYCERIN (NITROSTAT) 0.4 MG SL tablet Place 0.4 mg under the tongue 7/6/21   Historical Provider, MD   potassium chloride (KLOR-CON M) 20 MEQ extended release tablet Take 10 mEq by mouth 3/8/22   Historical Provider, MD   rosuvastatin (CRESTOR) 40 MG tablet Take 40 mg by mouth daily    Historical Provider, MD   sacubitril-valsartan (ENTRESTO) 49-51 MG per tablet TAKE 1 TABLET BY MOUTH TWICE DAILY 9/24/21   Historical Provider, MD       Current medications:    Current Facility-Administered Medications   Medication Dose Route Frequency Provider Last Rate Last Admin    lactated ringers infusion   IntraVENous Continuous Joan Jarquin  mL/hr at 07/15/22 0834 New Bag at 07/15/22 0834       Allergies:  No Known Allergies    Problem List:    Patient Active Problem List   Diagnosis Code    Muscle spasm of back M62.830    Leg pain, diffuse, left M79.605    Myofascial muscle pain M79.18       Past Medical History:        Diagnosis Date    Arthritis     Cerebral artery occlusion with cerebral infarction ([address]     Chronic back pain     Hypertension        Past Surgical History:        Procedure Laterality Date    CARDIAC SURGERY      KNEE SURGERY Left        Social History:    Social History     Tobacco Use    Smoking status: Former    Smokeless tobacco: Never   Substance Use Topics    Alcohol use: Not Currently                                Counseling given: Not Answered      Vital Signs (Current):   Vitals:    07/15/22 0819   BP: (!) 174/84   Pulse: 70   Resp: 18   Temp: 98.5 °F (36.9 °C)   TempSrc: Temporal   SpO2: 100%   Weight: intravenous. Anesthetic plan and risks discussed with patient.                         INDER Gonzales - CRNA   7/15/2022

## 2022-07-15 NOTE — PROGRESS NOTES
Patients bp is elevated. He stated that he did not take his bp med this morning. I instructed him to take his meds as soon as he gets back home and he verbalized understanding.

## 2022-07-15 NOTE — DISCHARGE INSTRUCTIONS
1. Repeat colonoscopy: Based on colonoscopy findings today and his prep quality as well as his age, he will not require routine colorectal cancer screening exams in the future unless his personal or family history as pertaining to colorectal cancer risk changes requiring an earlier exam or if the patient were to develop lower GI symptoms such as bleeding, abdominal pain, change in bowel habits or stool caliber or if the patient has anemia or unexplained weight loss in the future. 2. - Resume previous meds and diet  - GI clinic f/u PRN   - Keep scheduled f/u appts with other MDs     POST-OP ORDERS: ENDOSCOPY & COLONOSCOPY:    1. Rest today. 2. DO NOT eat or drink until wide awake; eat your usual diet today in moderate amount only. 3. DO NOT drive today. 4. Call physician if you have severe pain, vomiting, fever, rectal bleeding or black bowel movements. 5.  If a biopsy was taken or a polyp removed, you should expect to hear results in about 21 days. If you have heard nothing from your physician by then, call the office for results. 6.  Discharge home when patient awake, vitals signs stable and tolerating liquids.

## 2022-07-15 NOTE — ANESTHESIA POSTPROCEDURE EVALUATION
Department of Anesthesiology  Postprocedure Note    Patient: Ko Marino. MRN: [id number]  YOB: 1945  Date of evaluation: 7/15/2022      Procedure Summary     Date: 07/15/22 Room / Location: [address]    Anesthesia Start: 9968 Anesthesia Stop: 1008    Procedure: COLORECTAL CANCER SCREENING, NOT HIGH RISK Diagnosis:       Colon cancer screening      (Colon cancer screening [Z12.11])    Surgeons: Marta Villanueva MD Responsible Provider: INDER Gallagher CRNA    Anesthesia Type: general, TIVA ASA Status: 3          Anesthesia Type: No value filed.     Tierra Phase I: Tierra Score: 10    Tierra Phase II:        Anesthesia Post Evaluation    Patient location during evaluation: bedside  Patient participation: complete - patient participated  Level of consciousness: awake  Pain score: 0  Airway patency: patent  Nausea & Vomiting: no nausea and no vomiting  Complications: no  Cardiovascular status: hemodynamically stable  Respiratory status: acceptable and spontaneous ventilation  Hydration status: euvolemic

## 2022-07-15 NOTE — OP NOTE
Patient: Elisabet rAanda : 1945  Med Rec#: [id number] Acc#: [id number]   Primary Care Provider INDER Jessica CNP    Date of Procedure:  7/15/2022    Endoscopist: Travis Walter MD, MD    Referring Provider: INDER Jessica CNP,     Operation Performed: Colonoscopy up to the cecum    Indications: History of colon polyps; needs colorectal cancer surveillance    Anesthesia:  Sedation was administered by anesthesia who monitored the patient during the procedure. I met with Elisabet Aranda prior to procedure. We discussed the procedure itself, and I have discussed the risks of endoscopy (including-- but not limited to-- pain, discomfort, bleeding potentially requiring second endoscopic procedure and/or blood transfusion, organ perforation requiring operative repair, damage to organs near the colon, infection, aspiration, cardiopulmonary/allergic reaction), benefits, indications to endoscopy. Additionally, we discussed options other than colonoscopy. The patient expressed understanding. All questions answered. The patient decided to proceed with the procedure. Signed informed consent was placed on the chart. Blood Loss: minimal    Withdrawal time: More than 6 minutes    Bowel Prep: adequate     Complications: no immediate complications    DESCRIPTION OF PROCEDURE:     A time out was performed. After written informed consent was obtained, the patient was placed in the left lateral position. The perianal area was inspected, and a digital rectal exam was performed. A rectal exam was performed: normal tone, no palpable lesions. At this point, a forward viewing Olympus colonoscope was inserted into the anus and carefully advanced to the cecum. The cecum was identified by the ileocecal valve and the appendiceal orifice. The colonoscope was then slowly withdrawn with careful inspection of the mucosa in a linear and circumferential fashion.  The scope was retroflexed in the rectum. Suction was utilized during the procedure to remove as much air as possible from the bowel. The colonoscope was removed from the patient, and the procedure was terminated. Findings are listed below. Findings: Moderate diverticulosis in the left colon. Otherwise, the mucosa appeared normal throughout the entire examined colon. NO large polyps or masses or strictures or colitis. Internal hemorrhoids-Grade 1  Retroflexion in the rectum was otherwise normal and revealed no further abnormalities      Retroflexion in the rectum was normal and revealed no further abnormalities         Recommendations:  1. Repeat colonoscopy: Based on colonoscopy findings today and his prep quality as well as his age, he will not require routine colorectal cancer screening exams in the future unless his personal or family history as pertaining to colorectal cancer risk changes requiring an earlier exam or if the patient were to develop lower GI symptoms such as bleeding, abdominal pain, change in bowel habits or stool caliber or if the patient has anemia or unexplained weight loss in the future. 2. - Resume previous meds and diet  - GI clinic f/u PRN   - Keep scheduled f/u appts with other MDs     Findings and recommendations were discussed w/ the patient. A copy of the images was provided.     Desiree Mann MD, MD  7/15/2022  9:48 AM

## 2022-07-15 NOTE — H&P
Patient Name: German Grimes. : 1945  MRN: [id number]  DATE: 07/15/22    Allergies: No Known Allergies     ENDOSCOPY  History and Physical    Procedure:    [] Diagnostic Colonoscopy       [x] Screening Colonoscopy  [] EGD      [] ERCP      [] EUS       [] Other    [x] Previous office notes/History and Physical reviewed from the patients chart. Please see EMR for further details of HPI. I have examined the patient's status immediately prior to the procedure and:      Indications/HPI:    []Abdominal Pain   []Cancer- GI/Lung     []Fhx of colon CA/polyps  []History of Polyps  []Barretts            []Melena  []Abnormal Imaging              []Dysphagia              []Persistent Pneumonia   []Anemia                            []Food Impaction        [x]History of Polyps  [] GI Bleed             []Pulmonary nodule/Mass   []Change in bowel habits []Heartburn/Reflux  []Rectal Bleed (BRBPR)  []Chest Pain - Non Cardiac []Heme (+) Stool []Ulcers  []Constipation  []Hemoptysis  []Varices  []Diarrhea  []Hypoxemia    []Nausea/Vomiting   [x]Screening   []Crohns/Colitis  []Other:     Anesthesia:   [x] MAC [] Moderate Sedation   [] General   [] None     ROS: 12 pt Review of Symptoms was negative unless mentioned above    Medications:   Prior to Admission medications    Medication Sig Start Date End Date Taking? Authorizing Provider   HYDROcodone-acetaminophen (NORCO) 5-325 MG per tablet Take 1 tablet by mouth 2 times daily as needed for Pain for up to 30 days.    Marianna INDER Long   acetaminophen (TYLENOL) 325 MG tablet Take 650 mg by mouth every 6 hours as needed    Historical Provider, MD   ammonium lactate (LAC-HYDRIN) 12 % lotion Apply topically 21   Historical Provider, MD   apixaban (ELIQUIS) 5 MG TABS tablet Take 5 mg by mouth 17   Historical Provider, MD   aspirin 81 MG EC tablet Take 81 mg by mouth daily    Historical Provider, MD   budesonide-formoterol (SYMBICORT) 160-4.5 MCG/ACT AERO Inhale 2 puffs into the lungs 2 times daily    Historical Provider, MD   carvedilol (COREG) 25 MG tablet Take 25 mg by mouth 4/19/22   Historical Provider, MD   Cholecalciferol 50 MCG (2000 UT) CAPS Take 1 capsule by mouth daily    Historical Provider, MD   diphenhydrAMINE (BENADRYL) 25 MG capsule Take 25 mg by mouth every 6 hours as needed    Historical Provider, MD   fenofibrate (TRICOR) 145 MG tablet Take 145 mg by mouth 4/29/22   Historical Provider, MD   fluticasone-salmeterol (ADVAIR) 250-50 MCG/ACT AEPB diskus inhaler Inhale into the lungs 12/21/21   Historical Provider, MD   folic acid (FOLVITE) 1 MG tablet Take 1 mg by mouth 4/29/22   Historical Provider, MD   furosemide (LASIX) 20 MG tablet Take 20 mg by mouth 3/8/22   Historical Provider, MD   albuterol-ipratropium (COMBIVENT RESPIMAT)  MCG/ACT AERS inhaler Inhale into the lungs 4/29/22   Historical Provider, MD   ketoconazole (NIZORAL) 2 % cream Apply topically 4/27/22   Historical Provider, MD   lisinopril (PRINIVIL;ZESTRIL) 40 MG tablet Take 40 mg by mouth 3/2/22   Historical Provider, MD   megestrol (MEGACE) 40 MG/ML suspension Take 800 mg by mouth daily    Historical Provider, MD   naloxone 4 MG/0.1ML LIQD nasal spray by Nasal route 7/6/21   Historical Provider, MD   nitroGLYCERIN (NITROSTAT) 0.4 MG SL tablet Place 0.4 mg under the tongue 7/6/21   Historical Provider, MD   potassium chloride (KLOR-CON M) 20 MEQ extended release tablet Take 10 mEq by mouth 3/8/22   Historical Provider, MD   rosuvastatin (CRESTOR) 40 MG tablet Take 40 mg by mouth daily    Historical Provider, MD   sacubitril-valsartan (ENTRESTO) 49-51 MG per tablet TAKE 1 TABLET BY MOUTH TWICE DAILY 9/24/21   Historical Provider, MD       Past Medical History:  Past Medical History:   Diagnosis Date    Arthritis     Cerebral artery occlusion with cerebral infarction ([facility] [address]     Chronic back pain     Hypertension        Past Surgical History:  Past Surgical History:   Procedure Laterality Date    CARDIAC SURGERY      KNEE SURGERY Left        Social History:  Social History     Tobacco Use    Smoking status: Former    Smokeless tobacco: Never   Vaping Use    Vaping Use: Never used   Substance Use Topics    Alcohol use: Not Currently       Vital Signs:   Vitals:    07/15/22 0819   BP: (!) 174/84   Pulse: 70   Resp: 18   Temp: 98.5 °F (36.9 °C)   SpO2: 100%        Physical Exam:  Cardiac:  [x]WNL  []Comments:  Pulmonary:  [x]WNL   []Comments:  Neuro/Mental Status:  [x]WNL  []Comments:  Abdominal:  [x]WNL    []Comments:  Other:   []WNL  []Comments:    Informed Consent:  The risks and benefits of the procedure have been discussed with either the patient or if they cannot consent, their representative. Assessment:  Patient examined and appropriate for planned sedation and procedure. Plan:  Proceed with planned sedation and procedure as above.          Leslie Mclean MD

## 2022-07-18 DIAGNOSIS — M79.18 MYOFASCIAL MUSCLE PAIN: ICD-10-CM

## 2022-07-18 DIAGNOSIS — M79.605 LEG PAIN, DIFFUSE, LEFT: ICD-10-CM

## 2022-07-18 DIAGNOSIS — M62.830 MUSCLE SPASM OF BACK: ICD-10-CM

## 2022-07-18 RX ORDER — HYDROCODONE BITARTRATE AND ACETAMINOPHEN 5; 325 MG/1; MG/1
1 TABLET ORAL 2 TIMES DAILY PRN
Qty: 60 TABLET | Refills: 0 | Status: SHIPPED | OUTPATIENT
Start: 2022-07-20 | End: 2022-08-15 | Stop reason: SDUPTHER

## 2022-08-15 DIAGNOSIS — M79.605 LEG PAIN, DIFFUSE, LEFT: ICD-10-CM

## 2022-08-15 DIAGNOSIS — M79.18 MYOFASCIAL MUSCLE PAIN: ICD-10-CM

## 2022-08-15 DIAGNOSIS — M62.830 MUSCLE SPASM OF BACK: ICD-10-CM

## 2022-08-15 RX ORDER — HYDROCODONE BITARTRATE AND ACETAMINOPHEN 5; 325 MG/1; MG/1
1 TABLET ORAL 2 TIMES DAILY PRN
Qty: 60 TABLET | Refills: 0 | Status: SHIPPED | OUTPATIENT
Start: 2022-08-18 | End: 2022-08-23 | Stop reason: SDUPTHER

## 2022-08-23 ENCOUNTER — HOSPITAL ENCOUNTER (OUTPATIENT)
Dept: PAIN MANAGEMENT | Age: 77
Discharge: HOME | End: 2022-08-23
Payer: OTHER GOVERNMENT

## 2022-08-23 VITALS
HEIGHT: 68 IN | DIASTOLIC BLOOD PRESSURE: 76 MMHG | TEMPERATURE: 96.8 F | SYSTOLIC BLOOD PRESSURE: 168 MMHG | OXYGEN SATURATION: 98 % | BODY MASS INDEX: 33.83 KG/M2 | WEIGHT: 223.25 LBS | RESPIRATION RATE: 20 BRPM | HEART RATE: 70 BPM

## 2022-08-23 DIAGNOSIS — M79.18 MYOFASCIAL MUSCLE PAIN: ICD-10-CM

## 2022-08-23 DIAGNOSIS — M62.830 MUSCLE SPASM OF BACK: ICD-10-CM

## 2022-08-23 DIAGNOSIS — M79.605 LEG PAIN, DIFFUSE, LEFT: ICD-10-CM

## 2022-08-23 PROCEDURE — 99213 OFFICE O/P EST LOW 20 MIN: CPT

## 2022-08-23 PROCEDURE — 99213 OFFICE O/P EST LOW 20 MIN: CPT | Performed by: NURSE PRACTITIONER

## 2022-08-23 RX ORDER — HYDROCODONE BITARTRATE AND ACETAMINOPHEN 5; 325 MG/1; MG/1
1 TABLET ORAL 2 TIMES DAILY PRN
Qty: 60 TABLET | Refills: 0 | Status: SHIPPED | OUTPATIENT
Start: 2022-09-18 | End: 2022-10-18 | Stop reason: SDUPTHER

## 2022-08-23 NOTE — PROGRESS NOTES
07/15/2022    Dr Mara Guo, Mod diverticulosis, int hem Gr 1, no routine recall due to age    [de-identified] SURGERY Left         Allergies  Patient has no known allergies. Current Medications  Current Outpatient Medications   Medication Sig Dispense Refill    [START ON 9/18/2022] HYDROcodone-acetaminophen (NORCO) 5-325 MG per tablet Take 1 tablet by mouth 2 times daily as needed for Pain for up to 30 days.  May fill 9- 60 tablet 0    acetaminophen (TYLENOL) 325 MG tablet Take 650 mg by mouth every 6 hours as needed      ammonium lactate (LAC-HYDRIN) 12 % lotion Apply topically      apixaban (ELIQUIS) 5 MG TABS tablet Take 5 mg by mouth      aspirin 81 MG EC tablet Take 81 mg by mouth daily      budesonide-formoterol (SYMBICORT) 160-4.5 MCG/ACT AERO Inhale 2 puffs into the lungs 2 times daily      carvedilol (COREG) 25 MG tablet Take 25 mg by mouth      Cholecalciferol 50 MCG (2000 UT) CAPS Take 1 capsule by mouth daily      diphenhydrAMINE (BENADRYL) 25 MG capsule Take 25 mg by mouth every 6 hours as needed      fenofibrate (TRICOR) 145 MG tablet Take 145 mg by mouth      fluticasone-salmeterol (ADVAIR) 250-50 MCG/ACT AEPB diskus inhaler Inhale into the lungs      folic acid (FOLVITE) 1 MG tablet Take 1 mg by mouth      furosemide (LASIX) 20 MG tablet Take 20 mg by mouth      albuterol-ipratropium (COMBIVENT RESPIMAT)  MCG/ACT AERS inhaler Inhale into the lungs      ketoconazole (NIZORAL) 2 % cream Apply topically      lisinopril (PRINIVIL;ZESTRIL) 40 MG tablet Take 40 mg by mouth      megestrol (MEGACE) 40 MG/ML suspension Take 800 mg by mouth daily      naloxone 4 MG/0.1ML LIQD nasal spray by Nasal route      nitroGLYCERIN (NITROSTAT) 0.4 MG SL tablet Place 0.4 mg under the tongue      potassium chloride (KLOR-CON M) 20 MEQ extended release tablet Take 10 mEq by mouth      rosuvastatin (CRESTOR) 40 MG tablet Take 40 mg by mouth daily      sacubitril-valsartan (ENTRESTO) 49-51 MG per tablet TAKE 1 TABLET depression or thoughts of suicide, or voices in head. Current Pain Assessment:        Clinical Progression: gradually improving  Effect of Pain on Daily Activities: limits activity  Patient's Stated Pain Goal: No pain  Pain Intervention(s): Medication (see eMar), Repositioning, Rest, Ice    Current PE.6    ORT Score: 7    PHQ-9 Score: 11    Physical Exam:    Vitals:    22 0850   BP: (!) 168/76   Pulse: 70   Resp: 20   Temp: 96.8 °F (36 °C)   TempSrc: Skin   SpO2: 98%   Weight: 223 lb 4 oz (101.3 kg)   Height: 5' 8\" (1.727 m)       Body mass index is 33.95 kg/m². General Appearance: No acute distress. Appears to be well dressed  Skin Exam: Warm and dry, no jaundice, rashes or leasions  Head Exam: NCAT, PERRLA, EOMI, scalp normal  Eye Exam: PERRLA, EOMI, conjunctivae clear  Ear Exam: Normal external auricles. No drainage from ear canals  Nose Exam: Normal alignment. Turbinates clear. No drainage  Mouth Exam: Oral mucosa pink and moist. Gums pink. Throat Exam: Posterior pharynx pink in color with no edema  Neck Exam: Supple, trachea midline. No masses palpated. Respiratory Exam: Clear to ausculation in all lobes anterior and posterior. Cardiovascular Exam: Regular rate and rhythm, no gallops, no rubs or murmurs. No edema in extremities  Gastrointestinal Exam: Bowel sounds in all quadrants, soft, non-distended, non-tender with palpation, no guarding   Musculoskeletal Exam: No joint swelling or deformity.  Tenderness with palpation of left groin post surgical area  Back Exam: Tenderness with palpation of muscles in lumbar area  Hip Exam: Full rotation bilateral  Shoulder Exam: Full rotation bilateral  Knee Exam: Full flexion and extension bilateral  Extremities: No rash, cyanosis or bruising  Neurologic Exam: Gait and coordination normal, speech normal and clear  Reflexes: Normal brachialis, Negative Villalba's bilateral. Normal Patellar bilateral,   CN EXAM: II-XII intact, face symmetrical, tongue symmetrical, the trapezius and sternocleidomastoid muscle appearance and strength symmetrical, normal achilles bilateral, ankle clonus negative bilateral  Strength: 5/5 RUE Bi's/Tri's, 5/5 LUE Bi's/Tri's, 5/5 RLE knee flex/ext, 5/5 RLE DF/PF, 5/5 LLE knee flex/ext, 5/5 LLE DF/PF  Sensation: Equal and intact to fine touch in all extremities  Mood and affect: Normal limits  Nurses note reviewed along with current vital signs    Active Problem(s)  Active Problems:    Muscle spasm of back    Myofascial muscle pain  Resolved Problems:    * No resolved hospital problems. *                                                                                                                                 PLAN:  1. Patient is to call the office with any questions or concerns that may arise prior to next appointment. 3. Continue Norco (sent to [place])  4. Schedule patient for bilateral Lumbar Trigger Point injections    Urine Drug Screen Today:   Yes  [x]    No  []     Discussion:  Discussed exam findings and plan of care with patient. Patient agreed with the current plan of care at this time. All questions from the patient were answered by the provider. Activity:   Discussed exercise as beneficial to pain reduction, encouraged stretching exercise with a focus on torso strengthening. Education Provided:  Review of Chitra Martinez [x]  Agreement Review  [x]  Reviewed PHQ-9  [x]      Reviewed ORT [x]  Review of Test  [x]  Compliance Issues Discussed [x]   Cognitive Behavior Needs  []  Exercise  [x]  Financial Issues  []   Tobacco/Alcohol Use  []  Teaching  [x]   New Patient Picture Obtained  [x]      [] Benzodiazapine's and Narcotics:  Patient educated on the possible effects of combining Benzodiazapine's and Opioids. Explained \"Black Box Warnings\" such as; possible suppressed breathing, hypoxia, anoxia, depressed cognition, heart arrhythmia, coma and possible death. Patient verbalized understanding concerning possible effects. Controlled Substance Monitoring:   Attestation: The KAYLA[facility] report for this patient was reviewed today. Discussed with patient possible medication side effects, risk of tolerance, dependence and alternative treatments. Discussed thegrowing epidemic in the [place] with the overprescribing and at times the abuse of narcotics. Discussed the detrimental effects of long term narcotic use. Patient encouraged to set daily goals of exercising and decreasing dailynarcotic intake. Discussed with the patient about the development of hyperalgesia with long term narcotic intake. EMR dragon/transcription disclaimer: Much of this encounter note is electronic transcription/translation of spoken language to printed tach. Electronic translation of spoken language may be erroneous, or at times, nonsensical words or phrases may be inadvertently transcribed. Although, I have reviewed the note for such errors, some may still exist.    CC:  INDER Curran - CNP    Thank you for this kind referral and allowing me to participate    in your patients care.     1 [facility], INDER, 8/23/2022 at 8:57 AM

## 2022-09-15 ENCOUNTER — HOSPITAL ENCOUNTER (OUTPATIENT)
Dept: PAIN MANAGEMENT | Age: 77
Discharge: HOME | End: 2022-09-15
Payer: OTHER GOVERNMENT

## 2022-09-15 PROCEDURE — 20553 NJX 1/MLT TRIGGER POINTS 3/>: CPT | Performed by: NURSE PRACTITIONER

## 2022-09-15 PROCEDURE — 20553 NJX 1/MLT TRIGGER POINTS 3/>: CPT

## 2022-09-15 PROCEDURE — 2500000003 HC RX 250 WO HCPCS

## 2022-09-15 RX ORDER — BUPIVACAINE HYDROCHLORIDE 5 MG/ML
10 INJECTION, SOLUTION EPIDURAL; INTRACAUDAL ONCE
Status: DISCONTINUED | OUTPATIENT
Start: 2022-09-15 | End: 2022-09-17 | Stop reason: HOSPADM

## 2022-09-15 RX ORDER — LIDOCAINE HYDROCHLORIDE 10 MG/ML
10 INJECTION, SOLUTION EPIDURAL; INFILTRATION; INTRACAUDAL; PERINEURAL ONCE
Status: DISCONTINUED | OUTPATIENT
Start: 2022-09-15 | End: 2022-09-17 | Stop reason: HOSPADM

## 2022-09-15 NOTE — DISCHARGE INSTRUCTIONS
will be numb for a few hours after the procedure    [x] I understand if a steroid was used it will take effect in 3 - 7 days. I understand that as the numbing medication wears off, the pain may return until the steroids start working. [x] I understand that today I will rest for the next 24 hours and drink plenty of water. [] For Botox for Migraines please do not wear anything constricting around the forehead for 7-10 days post injection. Examples headband, hats, or bandana    [] For Botox for Spasticity start therapy for the affected limb in two weeks. [] Additional instructions: ______________________________________________ ___________________________________________________________________    Sedation:  Was oral sedation given? [] Yes  [x] No    I understand that if I took an oral nerve calming medication I will not drive for  [] 24 hours after taking the medication.     [x] I have received a copy of my discharge instructions and understand the above instructions to the best of my knowledge    Patient Discharged:  [x] Home  [] Hospital  [] Other  ____________________________________________    Via:  [x] Ambulatory  [] Wheelchair   [] Stretcher [] EMS       Accompanied By:   [] Significant other  [] Family Member  [] Friend   [] Hospital Staff  []  Ambulance Staff  [] Other_______________________________________________    Plan:  [x] Will return to the office in   [] 1 month   [] 3 months for:  [] Procedure Follow-up  [x] Office Visit   [] Planned Procedure      Patient Signature: _____________________________________________________    Staff Signature: _______________________________________________________        If you have questions, problems or concerns you may call [phone] and follow the prompts    INDER Jewell

## 2022-09-16 NOTE — PROCEDURES
[facility] Physical and Pain Medicine      Patient Name: Aaron Hernandez. : 1945                    Age: 68 y.o. Sex: male    Date: 9/15/2022    Pre-op Diagnosis: Myofascial Pain/ Muscle Spasms    Post-op Diagnosis: Myofascial Pain/ Muscle Spasms    Procedure: Lumbar Trigger Point Injections    Performing Procedure: Adria Mcdonald, MSN, APRN, FNP-C    Previously Had Procedure:  [x] Yes    [] No    No data found. Description of Procedure:    After a brief physical assessment and failure to improve with conservative measures the patient presented for Lumbar Trigger Point Injections. The indications, limitations and possible complications were discussed with the patient and the patient elected to proceed with the procedure. After voluntary, informed and signed consent obtained the patient was placed in a   [] Sitting  []  Prone position     Appropriate time out was obtained per policy. The area of maximal tenderness was palpated over the  [] Right   [] Left   [x] Bilateral Lower  [x] Latissimus  [x] Erector Spinae   [x] Lumbar Paraspinous. The skin overlying these areas was marked with a skin marker. The skin overlying the proposed injection sites were then sprayed with Gebauer's Solution and prepped in a sterile fashion with Prevantics swab. Each trigger point of the  [] Right   [] Left   [x] Bilateral Lower  [x] Latissimus  [x] Erector Spinae   [x] Lumbar Paraspinous  was then injected after negative aspiration using a 25 gauge 1 1/2 in needle with approximately 2 ml of a solution of     [x] 5 ml of 1% Lidocaine Plain and 5 ml of 0.5% Marcaine Plain per 10 ml syringe  [] Toradol 0.5 ml (30 mg/ml) per 10 ml syringe    Sterile dressings applied. Discharge: The patient tolerated the procedure well. There were no complications during the procedure and the patient was discharged home with discharge instructions.     The patient has been instructed to contact the office should there be any complications or questions to arise between today and their next appointment. [facility] notified of bug infestation on body of patient.      Plan:  [x] Will return to the office in  [] 1 month  [x]  4 - 6 weeks  [] 2 months   [] 3 months for:  [] Planned Procedure [x] Procedure Follow-up  [] Office Visit      1 [facility], [facility] 9/16/2022 at 12:14 PM

## 2022-10-17 DIAGNOSIS — M62.830 MUSCLE SPASM OF BACK: ICD-10-CM

## 2022-10-17 DIAGNOSIS — M79.18 MYOFASCIAL MUSCLE PAIN: ICD-10-CM

## 2022-10-17 DIAGNOSIS — M79.605 LEG PAIN, DIFFUSE, LEFT: ICD-10-CM

## 2022-10-18 RX ORDER — HYDROCODONE BITARTRATE AND ACETAMINOPHEN 5; 325 MG/1; MG/1
1 TABLET ORAL 2 TIMES DAILY PRN
Qty: 60 TABLET | Refills: 0 | Status: SHIPPED | OUTPATIENT
Start: 2022-10-18 | End: 2022-10-19 | Stop reason: SDUPTHER

## 2022-10-19 DIAGNOSIS — M79.605 LEG PAIN, DIFFUSE, LEFT: ICD-10-CM

## 2022-10-19 DIAGNOSIS — M62.830 MUSCLE SPASM OF BACK: ICD-10-CM

## 2022-10-19 DIAGNOSIS — M79.18 MYOFASCIAL MUSCLE PAIN: ICD-10-CM

## 2022-10-19 RX ORDER — HYDROCODONE BITARTRATE AND ACETAMINOPHEN 5; 325 MG/1; MG/1
1 TABLET ORAL 2 TIMES DAILY PRN
Qty: 60 TABLET | Refills: 0 | Status: SHIPPED | OUTPATIENT
Start: 2022-10-19 | End: 2022-11-18

## 2022-11-08 ENCOUNTER — HOSPITAL ENCOUNTER (OUTPATIENT)
Dept: PAIN MANAGEMENT | Age: 77
Discharge: HOME | End: 2022-11-08
Payer: OTHER GOVERNMENT

## 2022-11-08 VITALS
WEIGHT: 229.25 LBS | HEART RATE: 70 BPM | DIASTOLIC BLOOD PRESSURE: 73 MMHG | HEIGHT: 68 IN | RESPIRATION RATE: 18 BRPM | OXYGEN SATURATION: 99 % | SYSTOLIC BLOOD PRESSURE: 164 MMHG | BODY MASS INDEX: 34.75 KG/M2 | TEMPERATURE: 96.2 F

## 2022-11-08 DIAGNOSIS — M79.605 LEG PAIN, DIFFUSE, LEFT: ICD-10-CM

## 2022-11-08 DIAGNOSIS — M62.830 MUSCLE SPASM OF BACK: ICD-10-CM

## 2022-11-08 DIAGNOSIS — M79.18 MYOFASCIAL MUSCLE PAIN: ICD-10-CM

## 2022-11-08 PROCEDURE — 99213 OFFICE O/P EST LOW 20 MIN: CPT

## 2022-11-08 PROCEDURE — 99213 OFFICE O/P EST LOW 20 MIN: CPT | Performed by: NURSE PRACTITIONER

## 2022-11-08 RX ORDER — HYDROCODONE BITARTRATE AND ACETAMINOPHEN 5; 325 MG/1; MG/1
1 TABLET ORAL 2 TIMES DAILY PRN
Qty: 60 TABLET | Refills: 0 | Status: SHIPPED | OUTPATIENT
Start: 2022-11-18 | End: 2022-12-18

## 2022-11-08 ASSESSMENT — PAIN DESCRIPTION - ORIENTATION: ORIENTATION: LOWER

## 2022-11-08 ASSESSMENT — PAIN - FUNCTIONAL ASSESSMENT: PAIN_FUNCTIONAL_ASSESSMENT: PREVENTS OR INTERFERES SOME ACTIVE ACTIVITIES AND ADLS

## 2022-11-08 ASSESSMENT — PAIN DESCRIPTION - ONSET: ONSET: ON-GOING

## 2022-11-08 ASSESSMENT — PAIN DESCRIPTION - DESCRIPTORS: DESCRIPTORS: ACHING;SPASM

## 2022-11-08 ASSESSMENT — PAIN DESCRIPTION - FREQUENCY: FREQUENCY: CONTINUOUS

## 2022-11-08 ASSESSMENT — PAIN SCALES - GENERAL: PAINLEVEL_OUTOF10: 5

## 2022-11-08 ASSESSMENT — PAIN DESCRIPTION - LOCATION: LOCATION: BACK;LEG

## 2022-11-08 ASSESSMENT — PAIN DESCRIPTION - PAIN TYPE: TYPE: CHRONIC PAIN

## 2022-11-08 NOTE — PROGRESS NOTES
[facility] Physical and Pain Medicine    History and Physical    Patient Name: Arin Glass. MR #: [id number]    Account #: [de-identified]    : 1945    Age: 68 y.o. Sex: male    Date: 2022    PCP: INDER Thurman CNP         Referring Provider:    Chief Complaint:   Chief Complaint   Patient presents with    Lower Back Pain       History of Present Illness:    Arin Glass. is a 68 y.o. male presents to the office for follow up of lumbar trigger point injections. He says that the injections really helped with his pain. He obtained 80% relief for 6 weeks. He is wanting the injections repeated. Says that nothing is new. He continues to take his Norco with some relief in his pain that allows him to complete ADL's. 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 Yes  []    No [x]   Did Physical Therapy make thepain better or worse? Better []   Worse []  Unchanged []     MRI in the last two years? Yes []  No [x]  Results reviewed with patient? Yes []   No []    CT Scan in the last two years? Yes  []   No [x]  Results reviewed with patient? Yes []   No []     X-ray in the last two years? Yes []   No  [x]  Results reviewed with patient? Yes  []  No []     Injections in the past?  Yes []   No [x]   Did the injections help relieve the pain? Yes []   No []     Do you have Depression? Yes  []    No [x]  Thinking of harming yourself or others?   Yes  []   No [x]    Past Medical Histoy  Past Medical History:   Diagnosis Date    Arthritis     Cerebral artery occlusion with cerebral infarction [facility])     Chronic back pain     Hypertension        Surgery History  Past Surgical History:   Procedure Laterality Date    CARDIAC SURGERY      COLONOSCOPY N/A 07/15/2022    Dr Tono Garcia, Mod diverticulosis, int hem Gr 1, no routine recall due to age    [de-identified] SURGERY Left         Allergies  Patient has no known allergies. Current Medications  Current Outpatient Medications   Medication Sig Dispense Refill    [START ON 11/18/2022] HYDROcodone-acetaminophen (NORCO) 5-325 MG per tablet Take 1 tablet by mouth 2 times daily as needed for Pain for up to 30 days.  May fill 11/18/2022 60 tablet 0    acetaminophen (TYLENOL) 325 MG tablet Take 650 mg by mouth every 6 hours as needed      ammonium lactate (LAC-HYDRIN) 12 % lotion Apply topically      apixaban (ELIQUIS) 5 MG TABS tablet Take 5 mg by mouth      aspirin 81 MG EC tablet Take 81 mg by mouth daily      budesonide-formoterol (SYMBICORT) 160-4.5 MCG/ACT AERO Inhale 2 puffs into the lungs 2 times daily      carvedilol (COREG) 25 MG tablet Take 25 mg by mouth      Cholecalciferol 50 MCG (2000 UT) CAPS Take 1 capsule by mouth daily      diphenhydrAMINE (BENADRYL) 25 MG capsule Take 25 mg by mouth every 6 hours as needed      fenofibrate (TRICOR) 145 MG tablet Take 145 mg by mouth      fluticasone-salmeterol (ADVAIR) 250-50 MCG/ACT AEPB diskus inhaler Inhale into the lungs      folic acid (FOLVITE) 1 MG tablet Take 1 mg by mouth      furosemide (LASIX) 20 MG tablet Take 20 mg by mouth      albuterol-ipratropium (COMBIVENT RESPIMAT)  MCG/ACT AERS inhaler Inhale into the lungs      ketoconazole (NIZORAL) 2 % cream Apply topically      lisinopril (PRINIVIL;ZESTRIL) 40 MG tablet Take 40 mg by mouth      megestrol (MEGACE) 40 MG/ML suspension Take 800 mg by mouth daily      naloxone 4 MG/0.1ML LIQD nasal spray by Nasal route      nitroGLYCERIN (NITROSTAT) 0.4 MG SL tablet Place 0.4 mg under the tongue      potassium chloride (KLOR-CON M) 20 MEQ extended release tablet Take 10 mEq by mouth      rosuvastatin (CRESTOR) 40 MG tablet Take 40 mg by mouth daily      sacubitril-valsartan (ENTRESTO) 49-51 MG per tablet TAKE 1 TABLET BY MOUTH TWICE DAILY       No current facility-administered medications for this encounter. Social History    Social History     Tobacco Use    Smoking status: Former    Smokeless tobacco: Never   Substance Use Topics    Alcohol use: Not Currently         Family History  family history is not on file. Review of Systems:  Constitutional: denies fever, chills, fatigue, change in appetite, weight gain or weight loss  Head: Normocephalic  Skin: denies easy bruising, skin redness, skin rash, hives, sensitivity to sun exposure, tightness, nodules or bumps, hair loss, color changes in the hands or feet, or feeling of temperature change such as coldness  Eyes: denies pain, redness, loss of vision, double or blurred vision, eye drainage, or dryness. ENT and Mouth: denies ringing in the ears, loss of hearing, nasal congestion, nasal discharge, no hoarseness, sore throat, or difficulty swallowing   Respiratory: denies chronic dry cough, coughing up blood, coughing up mucus, waking at night coughing or choking, or wheezing  Cardiovascular: denies chest pain, irregular heartbeats, palpitations, shortness of breath, or edema in legs  Gastrointestinal: denies, nausea, vomiting, heartburn, diarrhea, constipation  Genitourinary: denies difficult urination, pain or burning with urination, blood in the urine, or cloudy urine  Musculoskeletal: denies arm, buttock, thigh or calf cramps. Has pain in low back, muscle spasms and tenderness in low back. No muscle weakness. No joint swelling. Neurologic: headache, dizziness, fainting, loss of consciousness, no sensitivity, no memory loss. .    Endocrine: denies intolerance to hot or cold temperature, night sweats, flushing, fingernail changes, increased thirst, or hairloss   Hematologic/ Lymphatic: denies anemia, bleeding tendency or clotting tendency, bruising easily.   Allergic/ Immunologic: denies rhinitis, asthma, skin sensitivity, or Latex allergy  Psychiatric: denies depression or thoughts of suicide, or voices in head. Current Pain Assessment:   Pain Assessment  Pain Assessment: 0-10  Pain Level: 5  Patient's Stated Pain Goal: 1  Pain Location: Back, Leg  Pain Orientation: Lower  Pain Descriptors: Aching, Spasm  Functional Pain Assessment: Prevents or interferes some active activities and ADLs  Pain Type: Chronic pain  Pain Frequency: Continuous  Pain Onset: On-going    Clinical Progression: gradually improving  Effect of Pain on Daily Activities: limits activity  Patient's Stated Pain Goal: No pain  Pain Intervention(s): Medication (see eMar), Repositioning, Rest, Ice    Current PE    ORT Score: 7    PHQ-9 Score: 11    Physical Exam:    Vitals:    22 0856   BP: (!) 164/73   Pulse: 70   Resp: 18   Temp: (!) 96.2 °F (35.7 °C)   TempSrc: Skin   SpO2: 99%   Weight: 229 lb 4 oz (104 kg)   Height: 5' 8\" (1.727 m)       Body mass index is 34.86 kg/m². General Appearance: No acute distress. Appears to be well dressed  Skin Exam: Warm and dry, no jaundice, rashes or leasions  Head Exam: NCAT, PERRLA, EOMI, scalp normal  Eye Exam: PERRLA, EOMI, conjunctivae clear  Ear Exam: Normal external auricles. No drainage from ear canals  Nose Exam: Normal alignment. Turbinates clear. No drainage  Mouth Exam: Oral mucosa pink and moist. Gums pink. Throat Exam: Posterior pharynx pink in color with no edema  Neck Exam: Supple, trachea midline. No masses palpated. Respiratory Exam: Clear to ausculation in all lobes anterior and posterior. Cardiovascular Exam: Regular rate and rhythm, no gallops, no rubs or murmurs. No edema in extremities  Gastrointestinal Exam: Bowel sounds in all quadrants, soft, non-distended, non-tender with palpation, no guarding   Musculoskeletal Exam: No joint swelling or deformity.  Tenderness with palpation of left groin post surgical area  Back Exam: Tenderness with palpation of muscles in lumbar area  Hip Exam: Full rotation bilateral  Shoulder Exam: Full rotation bilateral  Knee Exam: Full flexion and extension bilateral  Extremities: No rash, cyanosis or bruising  Neurologic Exam: Gait and coordination normal, speech normal and clear  Reflexes: Normal brachialis, Negative Villalba's bilateral. Normal Patellar bilateral,   CN EXAM: II-XII intact, face symmetrical, tongue symmetrical, the trapezius and sternocleidomastoid muscle appearance and strength symmetrical, normal achilles bilateral, ankle clonus negative bilateral  Strength: 5/5 RUE Bi's/Tri's, 5/5 LUE Bi's/Tri's, 5/5 RLE knee flex/ext, 5/5 RLE DF/PF, 5/5 LLE knee flex/ext, 5/5 LLE DF/PF  Sensation: Equal and intact to fine touch in all extremities  Mood and affect: Normal limits  Nurses note reviewed along with current vital signs    Active Problem(s)  Active Problems:    Muscle spasm of back    Myofascial muscle pain  Resolved Problems:    * No resolved hospital problems. *                                                                                                                                 PLAN:  1. Patient is to call the office with any questions or concerns that may arise prior to next appointment. 3. Continue Norco (sent to [place])  4. Schedule patient for bilateral Lumbar Trigger Point injections    Urine Drug Screen Today:   Yes  [x]    No  []     Discussion:  Discussed exam findings and plan of care with patient. Patient agreed with the current plan of care at this time. All questions from the patient were answered by the provider. Activity:   Discussed exercise as beneficial to pain reduction, encouraged stretching exercise with a focus on torso strengthening.     Education Provided:  Review of [place] [x]  Agreement Review  [x]  Reviewed PHQ-9  [x]      Reviewed ORT [x]  Review of Test  [x]  Compliance Issues Discussed [x]   Cognitive Behavior Needs  []  Exercise  [x]  Financial Issues  []   Tobacco/Alcohol Use  []  Teaching  [x]   New Patient Picture Obtained  [x]      [] Benzodiazapine's and Narcotics:  Patient educated on the possible effects of combining Benzodiazapine's and Opioids. Explained \"Black Box Warnings\" such as; possible suppressed breathing, hypoxia, anoxia, depressed cognition, heart arrhythmia, coma and possible death. Patient verbalized understanding concerning possible effects. Controlled Substance Monitoring:   Attestation: The KAYLAH report for this patient was reviewed today. Discussed with patient possible medication side effects, risk of tolerance, dependence and alternative treatments. Discussed thegrowing epidemic in the [place] with the overprescribing and at times the abuse of narcotics. Discussed the detrimental effects of long term narcotic use. Patient encouraged to set daily goals of exercising and decreasing dailynarcotic intake. Discussed with the patient about the development of hyperalgesia with long term narcotic intake. EMR dragon/transcription disclaimer: Much of this encounter note is electronic transcription/translation of spoken language to printed tach. Electronic translation of spoken language may be erroneous, or at times, nonsensical words or phrases may be inadvertently transcribed. Although, I have reviewed the note for such errors, some may still exist.    CC:  María Islas, APRN - CNP    Thank you for this kind referral and allowing me to participate    in your patients care.     1 [facility], INDER, 11/8/2022 at 9:15 AM

## 2022-11-08 NOTE — PROGRESS NOTES
Clinic Documentation      Education Provided:  [x] Review of Viv Chávez  [] Agreement Review  [x] PEG Score Calculated [] PHQ Score Calculated [] ORT Score Calculated    [] Compliance Issues Discussed [] Cognitive Behavior Needs [x] Exercise [] Review of Test [] Financial Issues  [x] Tobacco/Alcohol Use Reviewed [x] Teaching [] New Patient [] Picture Obtained    Physician Plan:  [] Outgoing Referral  [] Pharmacy Consult  [] Test Ordered [x] Prescription Ordered/Changed   [] Obtained Test Results / Consult Notes        Complete if patient is withholding blood thinner for procedure     Blood Thinner Patient is currently taking:      [] Plavix (Hold for 7 days)  [] Aspirin (Hold for 5 days)     [] Pletal (Hold for 2 days)  [] Pradaxa (Hold for 3 days)    [] Effient (Hold for 7 days)  [] Xarelto (Hold for 2 days)    [] Eliquis (Hold for 2 days)  [] Brilinta (Hold for 7 days)    [] Coumadin (Hold for 5 days) - (INR needs to be drawn the day prior to procedure- INR < 2.0)    [] Aggrenox (Hold for 7 days)        [] Patient will stop medication on their own.    [] Blood Thinner Form Faxed for approval to hold.    Provider form faxed to:     Assessment Completed by:  Electronically signed by Dipti Hernandez MA on 11/8/2022 at 3:31 PM

## 2022-12-07 ENCOUNTER — TELEPHONE (OUTPATIENT)
Dept: PAIN MANAGEMENT | Age: 77
End: 2022-12-07

## 2022-12-15 ENCOUNTER — TELEPHONE (OUTPATIENT)
Dept: PAIN MANAGEMENT | Age: 77
End: 2022-12-15

## 2022-12-15 NOTE — TELEPHONE ENCOUNTER
Attempted to reach patient regarding his appointments. Patient does not answer, and does not have a voicemail set up.

## 2022-12-21 ENCOUNTER — TELEPHONE (OUTPATIENT)
Dept: PAIN MANAGEMENT | Age: 77
End: 2022-12-21

## 2023-02-08 NOTE — TELEPHONE ENCOUNTER
Pt has appt on 8/23/22. Imiquimod Counseling:  I discussed with the patient the risks of imiquimod including but not limited to erythema, scaling, itching, weeping, crusting, and pain.  Patient understands that the inflammatory response to imiquimod is variable from person to person and was educated regarded proper titration schedule.  If flu-like symptoms develop, patient knows to discontinue the medication and contact us.

## (undated) DEVICE — THE CHANNEL CLEANING BRUSH IS A NYLON FLEXI BRUSH ATTACHED TO A FLEXIBLE PLASTIC SHEATH DESIGNED TO SAFELY REMOVE DEBRIS FROM FLEXIBLE ENDOSCOPES.

## (undated) DEVICE — YANKAUER,BULB TIP WITH VENT: Brand: ARGYLE

## (undated) DEVICE — TBG SMPL FLTR LINE NASL 02/C02 A/ BX/100

## (undated) DEVICE — SENSR O2 OXIMAX FNGR A/ 18IN NONSTR

## (undated) DEVICE — CUFF,BP,DISP,1 TUBE,ADULT,HP: Brand: MEDLINE

## (undated) DEVICE — ENDO KIT,LOURDES HOSPITAL: Brand: MEDLINE INDUSTRIES, INC.

## (undated) DEVICE — Device: Brand: DEFENDO AIR/WATER/SUCTION AND BIOPSY VALVE

## (undated) DEVICE — MASK,OXYGEN,MED CONC,ADLT,7' TUB, UC: Brand: PENDING